# Patient Record
Sex: FEMALE | Race: WHITE | Employment: FULL TIME | URBAN - METROPOLITAN AREA
[De-identification: names, ages, dates, MRNs, and addresses within clinical notes are randomized per-mention and may not be internally consistent; named-entity substitution may affect disease eponyms.]

---

## 2017-04-10 ENCOUNTER — OFFICE VISIT (OUTPATIENT)
Dept: NEPHROLOGY | Facility: CLINIC | Age: 52
End: 2017-04-10

## 2017-04-10 VITALS
BODY MASS INDEX: 45 KG/M2 | DIASTOLIC BLOOD PRESSURE: 82 MMHG | WEIGHT: 262 LBS | RESPIRATION RATE: 14 BRPM | HEART RATE: 84 BPM | SYSTOLIC BLOOD PRESSURE: 118 MMHG

## 2017-04-10 DIAGNOSIS — R80.9 MICROALBUMINURIA: Primary | ICD-10-CM

## 2017-04-10 PROCEDURE — 99242 OFF/OP CONSLTJ NEW/EST SF 20: CPT | Performed by: INTERNAL MEDICINE

## 2017-04-10 NOTE — PROGRESS NOTES
Consult Note      REASON FOR CONSULT: Microalbuminuria         HPI:   Elvira Rizo is a 47year old female with Patient presents with:  Proteinuria    MD Gi Chavez was seen in the nephrology clinic today in consultation for manage ENDOSCOPY X'S 3    COLONOSCOPY      UPPER GI ENDOSCOPY,DIAGNOSIS N/A 10/10/2015    Comment Procedure: ESOPHAGOGASTRODUODENOSCOPY, COLONOSCOPY, POSSIBLE BIOPSY, POSSIBLE POLYPECTOMY 13226, 04615;  Surgeon: Mingo Ashton MD;  Location: [address] [place] [address] (two) times daily.  Disp: 180 tablet Rfl: 1   Insulin Pen Needle (BD PEN NEEDLE MINI U/F) 31G X 5 MM Does not apply Misc For BID use with insulin Disp: 200 each Rfl: 2   EVANGELINA MICROLET LANCETS Does not apply Misc CHECK SUGAR TWICE DAILY; A1C 9.2 Disp: 200 e lb    General: Alert and oriented in no apparent distress. HEENT: No scleral icterus, MMM  Neck: Supple, no KRISSY or thyromegaly  Cardiac: Regular rate and rhythm, S1, S2 normal, no murmur or rub  Lungs: Clear without wheezes, rales, rhonchi. Abdomen:  So was unable to tolerate angiotension receptor blocker as well.   Fortunately, she continues to have sub-nephrotic range proteinuria and we had a lengthy discussion in the office today regarding the importance of ongoing blood glucose control in an effort to

## 2017-06-08 RX ORDER — SPIRONOLACTONE 50 MG/1
50 TABLET, FILM COATED ORAL DAILY
Qty: 30 TABLET | Refills: 5 | Status: SHIPPED | OUTPATIENT
Start: 2017-06-08 | End: 2018-01-02

## 2017-07-17 PROCEDURE — 36415 COLL VENOUS BLD VENIPUNCTURE: CPT | Performed by: INTERNAL MEDICINE

## 2017-07-17 PROCEDURE — 82570 ASSAY OF URINE CREATININE: CPT | Performed by: INTERNAL MEDICINE

## 2017-07-17 PROCEDURE — 82043 UR ALBUMIN QUANTITATIVE: CPT | Performed by: INTERNAL MEDICINE

## 2017-07-31 ENCOUNTER — OFFICE VISIT (OUTPATIENT)
Dept: FAMILY MEDICINE CLINIC | Facility: CLINIC | Age: 52
End: 2017-07-31

## 2017-07-31 VITALS
BODY MASS INDEX: 37.05 KG/M2 | TEMPERATURE: 98 F | WEIGHT: 217 LBS | HEIGHT: 64 IN | HEART RATE: 104 BPM | SYSTOLIC BLOOD PRESSURE: 122 MMHG | DIASTOLIC BLOOD PRESSURE: 70 MMHG | RESPIRATION RATE: 18 BRPM

## 2017-07-31 DIAGNOSIS — J02.0 STREP THROAT: Primary | ICD-10-CM

## 2017-07-31 DIAGNOSIS — J02.9 SORE THROAT: ICD-10-CM

## 2017-07-31 LAB
CONTROL LINE PRESENT WITH A CLEAR BACKGROUND (YES/NO): YES YES/NO
STREP GRP A CUL-SCR: POSITIVE

## 2017-07-31 PROCEDURE — 99213 OFFICE O/P EST LOW 20 MIN: CPT | Performed by: PHYSICIAN ASSISTANT

## 2017-07-31 PROCEDURE — 87880 STREP A ASSAY W/OPTIC: CPT | Performed by: PHYSICIAN ASSISTANT

## 2017-07-31 RX ORDER — FLUCONAZOLE 150 MG/1
150 TABLET ORAL ONCE
Qty: 1 TABLET | Refills: 0 | Status: SHIPPED | OUTPATIENT
Start: 2017-07-31 | End: 2017-07-31

## 2017-07-31 RX ORDER — AMOXICILLIN 875 MG/1
875 TABLET, COATED ORAL 2 TIMES DAILY
Qty: 20 TABLET | Refills: 0 | Status: SHIPPED | OUTPATIENT
Start: 2017-07-31 | End: 2017-07-31 | Stop reason: CLARIF

## 2017-07-31 NOTE — PATIENT INSTRUCTIONS
-Cough drops, chloraseptic spray, warm tea with honey.  -Cool mist humidifier at night.    -tylenol for pain.  -If unable to swallow, have fever of 104, unable to tolerate fluids, or have any other worsening symptoms, please go to ER immediately.    Jacques Mallory Follow up with your healthcare provider or our staff if you are not improving over the next week.   When to seek medical advice  Call your healthcare provider right away if any of these occur:  · Fever as directed by your doctor   · New or worsening ear avis

## 2017-07-31 NOTE — PROGRESS NOTES
CHIEF COMPLAINT:   Patient presents with:  Sore Throat: fever, bodyaches x 1 day       HPI:   Phan Herzog is a 47year old female who presents for sore throat for  2 days. Has been exposed to strep.   Patient reports fever with tmax 101.2 yesterday, sw Loratadine-Pseudoephedrine ER (CLARITIN-D 12 HOUR) 5-120 MG Oral Tablet 12 Hr One tab bid prn Disp: 30 tablet Rfl: 1   Desonide 0.05 % External Ointment Apply to AA's of eyelids BID x 1 week. PRN flares.  Disp: 30 g Rfl: 1   Mometasone Furoate 0.1 % Externa COLONOSCOPY, POSSIBLE BIOPSY, POSSIBLE                POLYPECTOMY 15129, 57617;  Surgeon: Alessia Kwon MD;  Location: [address] [place] [address]      Smoking status: Never Smoker STREP GRP A CUL-SCR Neg Negative   Control Line Present with a clear background (yes/no) Yes Yes/No   Kit Lot # [id number] Numeric   Kit Expiration Date 11/30/2018 Date       ASSESSMENT AND PLAN:   Chichi Moreno is a 47year old female who presents with · No work or school for the first 2 days of taking the antibiotics. After this time, you will not be contagious.  You can then return to school or work if you are feeling better.   · The antibiotic medication must be taken for the full 10 days, even if you © 9124-9564 [address], [address] [place]. All rights reserved. This information is not intended as a substitute for professional medical care. Always follow your healthcare professional's instructions.       Follow up

## 2017-08-02 PROCEDURE — 82607 VITAMIN B-12: CPT | Performed by: INTERNAL MEDICINE

## 2017-08-02 PROCEDURE — 36415 COLL VENOUS BLD VENIPUNCTURE: CPT | Performed by: INTERNAL MEDICINE

## 2017-08-02 PROCEDURE — 86735 MUMPS ANTIBODY: CPT | Performed by: INTERNAL MEDICINE

## 2017-10-30 ENCOUNTER — OFFICE VISIT (OUTPATIENT)
Dept: FAMILY MEDICINE CLINIC | Facility: CLINIC | Age: 52
End: 2017-10-30

## 2017-10-30 VITALS
SYSTOLIC BLOOD PRESSURE: 134 MMHG | BODY MASS INDEX: 37.91 KG/M2 | TEMPERATURE: 99 F | WEIGHT: 224.81 LBS | RESPIRATION RATE: 18 BRPM | OXYGEN SATURATION: 97 % | DIASTOLIC BLOOD PRESSURE: 82 MMHG | HEIGHT: 64.5 IN | HEART RATE: 104 BPM

## 2017-10-30 DIAGNOSIS — J01.00 ACUTE MAXILLARY SINUSITIS, RECURRENCE NOT SPECIFIED: Primary | ICD-10-CM

## 2017-10-30 PROCEDURE — 99213 OFFICE O/P EST LOW 20 MIN: CPT | Performed by: NURSE PRACTITIONER

## 2017-10-30 NOTE — PROGRESS NOTES
CHIEF COMPLAINT:   Patient presents with:  Cough: cough, congestion, runny nose, sinus and facial pressure, teeth pain      HPI:   Nakul Ruff is a 47year old female who presents for cold symptoms for  1  weeks.  Symptoms have progressed into sinus con Insulin Pen Needle (BD PEN NEEDLE MINI U/F) 31G X 5 MM Does not apply Misc Four time daily Disp: 400 each Rfl: 1   Omega-3 Fatty Acids (FISH OIL) 1200 MG Oral Cap Take by mouth daily.  Disp:  Rfl:    insulin aspart (NOVOLOG FLEXPEN) 100 UNIT/ML Subcutaneous COLONOSCOPY, POSSIBLE BIOPSY, POSSIBLE                POLYPECTOMY 91141, 94094;  Surgeon: Coy Fernandez MD;  Location: [address] [place] [address]  9/08 [facility]: OTHER SURGICAL HISTORY      Comment: ENDOSCOPY X'S 3 LUNGS: clear to auscultation bilaterally, no wheezes or rhonchi. Breathing is non labored. CARDIO: RRR without murmur  EXTREMITIES: no cyanosis, clubbing or edema  LYMPH:  No cervical lymphadenopathy.         ASSESSMENT AND PLAN:   Tina Lincoln is a 47 Applying heat to the area surrounding your sinuses may make you feel more comfortable. Use a hot water bottle or a hand towel dipped in hot water. Some people also find ice packs effective for relieving pain.   Medicines  Your doctor may prescribe medicatio

## 2017-11-18 PROCEDURE — 82607 VITAMIN B-12: CPT | Performed by: INTERNAL MEDICINE

## 2018-01-07 RX ORDER — SPIRONOLACTONE 50 MG/1
TABLET, FILM COATED ORAL
Qty: 30 TABLET | Refills: 3 | Status: SHIPPED | OUTPATIENT
Start: 2018-01-07 | End: 2018-08-20

## 2018-02-06 ENCOUNTER — APPOINTMENT (OUTPATIENT)
Dept: LAB | Age: 53
End: 2018-02-06
Attending: INTERNAL MEDICINE
Payer: COMMERCIAL

## 2018-02-06 DIAGNOSIS — R80.9 MICROALBUMINURIA: ICD-10-CM

## 2018-02-06 PROCEDURE — 82570 ASSAY OF URINE CREATININE: CPT

## 2018-02-06 PROCEDURE — 82043 UR ALBUMIN QUANTITATIVE: CPT

## 2018-02-07 ENCOUNTER — OFFICE VISIT (OUTPATIENT)
Dept: NEPHROLOGY | Facility: CLINIC | Age: 53
End: 2018-02-07

## 2018-02-07 VITALS — SYSTOLIC BLOOD PRESSURE: 126 MMHG | WEIGHT: 223 LBS | DIASTOLIC BLOOD PRESSURE: 78 MMHG | BODY MASS INDEX: 38 KG/M2

## 2018-02-07 DIAGNOSIS — R80.9 MICROALBUMINURIA: Primary | ICD-10-CM

## 2018-02-07 LAB
CREAT UR-SCNC: 108 MG/DL
MICROALBUMIN UR-MCNC: 189 MG/DL
MICROALBUMIN/CREAT 24H UR-RTO: 1750 UG/MG (ref ?–30)

## 2018-02-07 PROCEDURE — 99214 OFFICE O/P EST MOD 30 MIN: CPT | Performed by: INTERNAL MEDICINE

## 2018-02-07 NOTE — PROGRESS NOTES
Nephrology Progress Note      Nayely Forbes is a 46year old female.     HPI:   Patient presents with:  Proteinuria      Les Vikas was seen in the nephrology clinic today in follow-up for management of microalbuminuria in the setting of long-standing Onset   • Cancer Father      Lung cancer   • Lung Disorder Father      COPD   • Hypertension Mother    • High Cholesterol Mother       Social History: Smoking status: Never Smoker                                                              Smokeless tobac Rfl: 3   Loratadine-Pseudoephedrine ER (CLARITIN-D 12 HOUR) 5-120 MG Oral Tablet 12 Hr One tab bid prn Disp: 30 tablet Rfl: 1   Desonide 0.05 % External Ointment Apply to AA's of eyelids BID x 1 week. PRN flares.  Disp: 30 g Rfl: 1   Mometasone Furoate 0.1 no palpable organomegaly  Extremities: Without clubbing, cyanosis or edema.   Neurologic: Alert and oriented, normal affect, cranial nerves grossly intact, moving all extremities  Skin: Warm and dry, no rashes      LABS:       Lab Results  Component Value D follow every 9 months or so for now.       Verl Harada, MD  2/7/2018  3:14 PM

## 2018-03-12 ENCOUNTER — OFFICE VISIT (OUTPATIENT)
Dept: FAMILY MEDICINE CLINIC | Facility: CLINIC | Age: 53
End: 2018-03-12

## 2018-03-12 VITALS — SYSTOLIC BLOOD PRESSURE: 128 MMHG | DIASTOLIC BLOOD PRESSURE: 76 MMHG

## 2018-03-12 DIAGNOSIS — R68.89 INFLUENZA-LIKE SYMPTOMS: Primary | ICD-10-CM

## 2018-03-12 PROCEDURE — 99213 OFFICE O/P EST LOW 20 MIN: CPT | Performed by: NURSE PRACTITIONER

## 2018-03-12 NOTE — PROGRESS NOTES
CHIEF COMPLAINT:   Patient presents with:  Cough/URI: x 1 week      HPI:   Nakul Ruff is a 46year old female who presents for upper respiratory symptoms for  1 weeks.  Patient reports sore throat only at the beginning of sx's, congestion, fever with T Loratadine-Pseudoephedrine ER (CLARITIN-D 12 HOUR) 5-120 MG Oral Tablet 12 Hr One tab bid prn Disp: 30 tablet Rfl: 1   Desonide 0.05 % External Ointment Apply to AA's of eyelids BID x 1 week. PRN flares.  Disp: 30 g Rfl: 1   Mometasone Furoate 0.1 % Externa Smoking status: Never Smoker                                                              Smokeless tobacco: Never Used                      Alcohol use:  No                  REVIEW OF SYSTEMS:   GENERAL: improving appetite  SKIN: no rashes or abnormal skin The patient is asked to f/u with PCP if sx's persist or worsen. Patient Instructions     Influenza (Adult)    Influenza is also called the flu. It is a viral illness that affects the air passages of your lungs. It is different from the common cold.  The fl Follow up with your healthcare provider, or as advised, if you are not getting better over the next week. If you are age 72 or older, talk with your provider about getting a pneumococcal vaccine every 5 years.  You should also get this vaccine if you have

## 2018-05-07 RX ORDER — SPIRONOLACTONE 50 MG/1
50 TABLET, FILM COATED ORAL
Qty: 30 TABLET | Refills: 3
Start: 2018-05-07

## 2018-05-08 ENCOUNTER — TELEPHONE (OUTPATIENT)
Dept: NEPHROLOGY | Facility: CLINIC | Age: 53
End: 2018-05-08

## 2018-05-08 RX ORDER — SPIRONOLACTONE 50 MG/1
50 TABLET, FILM COATED ORAL DAILY
Qty: 90 TABLET | Refills: 3 | Status: SHIPPED | OUTPATIENT
Start: 2018-05-08 | End: 2019-07-26

## 2018-05-14 RX ORDER — SPIRONOLACTONE 50 MG/1
TABLET, FILM COATED ORAL
Qty: 30 TABLET | Refills: 2 | OUTPATIENT
Start: 2018-05-14

## 2018-07-16 ENCOUNTER — OFFICE VISIT (OUTPATIENT)
Dept: FAMILY MEDICINE CLINIC | Facility: CLINIC | Age: 53
End: 2018-07-16

## 2018-07-16 VITALS
DIASTOLIC BLOOD PRESSURE: 74 MMHG | BODY MASS INDEX: 36.26 KG/M2 | TEMPERATURE: 99 F | RESPIRATION RATE: 18 BRPM | HEART RATE: 86 BPM | OXYGEN SATURATION: 97 % | WEIGHT: 215 LBS | HEIGHT: 64.5 IN | SYSTOLIC BLOOD PRESSURE: 136 MMHG

## 2018-07-16 DIAGNOSIS — M25.572 ACUTE BILATERAL ANKLE PAIN: Primary | ICD-10-CM

## 2018-07-16 DIAGNOSIS — M25.571 ACUTE BILATERAL ANKLE PAIN: Primary | ICD-10-CM

## 2018-07-16 DIAGNOSIS — M72.2 PLANTAR FASCIITIS, BILATERAL: ICD-10-CM

## 2018-07-16 PROCEDURE — 99213 OFFICE O/P EST LOW 20 MIN: CPT | Performed by: NURSE PRACTITIONER

## 2018-07-16 NOTE — PATIENT INSTRUCTIONS
- Advise rest, elevation, tylenol as needed. - If any increasing pain or swelling, you should follow up with primary doctor within 2-3 days.  - Go to the ER if ever pain or swelling is elevating anywhere into the calf area.       Understanding Heel Pain © 5012-5073 [employer] [address]. [address] [facility] [place]. All rights reserved. This information is not intended as a substitute for professional medical care. Always follow your healthcare professional's instructions.

## 2018-07-16 NOTE — PROGRESS NOTES
CHIEF COMPLAINT:     Patient presents with:   Foot Pain: Pain in soles of feet and ankles, x2-3 days      HPI:   Jennifer Gagnon is a 46year old female who presents with complaints of bilateral foot pain (mostly sole of foot, beginning at heel) and bilater clotrimazole-betamethasone 1-0.05 % External Cream Apply daily Disp: 15 g Rfl: 0   Fenofibrate 145 MG Oral Tab TAKE 1 BY MOUTH DAILY Disp: 90 tablet Rfl: 1   SPIRONOLACTONE 50 MG Oral Tab TAKE ONE TABLET BY MOUTH ONE TIME DAILY  Disp: 30 tablet Rfl: 3   Al • Type II or unspecified type diabetes mellitus without mention of complication, not stated as uncontrolled    • Unspecified essential hypertension       Social History:  Smoking status: Never Smoker - Allergy to NSAIDs and is diabetic-- will try to avoid PO steroid if possible. - Pt requesting short term note for work.   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 The pain may be sharp when you get out of bed or when you stand up after sitting for a while.  You may feel a dull ache in your heel after you’ve been standing for a long time on a hard anita

## 2018-07-26 PROBLEM — E11.65 UNCONTROLLED TYPE 2 DIABETES MELLITUS WITH BOTH EYES AFFECTED BY MILD NONPROLIFERATIVE RETINOPATHY WITHOUT MACULAR EDEMA, WITH LONG-TERM CURRENT USE OF INSULIN (HCC): Status: ACTIVE | Noted: 2018-07-26

## 2018-07-26 PROBLEM — E11.3293 UNCONTROLLED TYPE 2 DIABETES MELLITUS WITH BOTH EYES AFFECTED BY MILD NONPROLIFERATIVE RETINOPATHY WITHOUT MACULAR EDEMA, WITH LONG-TERM CURRENT USE OF INSULIN (HCC): Status: ACTIVE | Noted: 2018-07-26

## 2018-07-26 PROBLEM — IMO0002 UNCONTROLLED TYPE 2 DIABETES MELLITUS WITH BOTH EYES AFFECTED BY MILD NONPROLIFERATIVE RETINOPATHY WITHOUT MACULAR EDEMA, WITH LONG-TERM CURRENT USE OF INSULIN: Status: ACTIVE | Noted: 2018-07-26

## 2018-07-26 PROBLEM — Z79.4 UNCONTROLLED TYPE 2 DIABETES MELLITUS WITH BOTH EYES AFFECTED BY MILD NONPROLIFERATIVE RETINOPATHY WITHOUT MACULAR EDEMA, WITH LONG-TERM CURRENT USE OF INSULIN (HCC): Status: ACTIVE | Noted: 2018-07-26

## 2018-08-13 RX ORDER — SPIRONOLACTONE 100 MG/1
TABLET, FILM COATED ORAL
Qty: 45 TABLET | Refills: 0 | OUTPATIENT
Start: 2018-08-13

## 2018-08-22 ENCOUNTER — OFFICE VISIT (OUTPATIENT)
Dept: FAMILY MEDICINE CLINIC | Facility: CLINIC | Age: 53
End: 2018-08-22
Payer: COMMERCIAL

## 2018-08-22 ENCOUNTER — HOSPITAL ENCOUNTER (OUTPATIENT)
Age: 53
Discharge: HOME | End: 2018-08-22
Payer: COMMERCIAL

## 2018-08-22 VITALS
RESPIRATION RATE: 20 BRPM | DIASTOLIC BLOOD PRESSURE: 70 MMHG | TEMPERATURE: 98 F | HEART RATE: 97 BPM | OXYGEN SATURATION: 100 % | SYSTOLIC BLOOD PRESSURE: 132 MMHG

## 2018-08-22 VITALS
WEIGHT: 228 LBS | RESPIRATION RATE: 20 BRPM | OXYGEN SATURATION: 97 % | TEMPERATURE: 99 F | DIASTOLIC BLOOD PRESSURE: 76 MMHG | HEART RATE: 84 BPM | BODY MASS INDEX: 38.93 KG/M2 | SYSTOLIC BLOOD PRESSURE: 142 MMHG | HEIGHT: 64 IN

## 2018-08-22 DIAGNOSIS — Z02.9 ENCOUNTERS FOR ADMINISTRATIVE PURPOSE: ICD-10-CM

## 2018-08-22 DIAGNOSIS — R80.9 PROTEINURIA, UNSPECIFIED TYPE: Primary | ICD-10-CM

## 2018-08-22 DIAGNOSIS — N76.0 ACUTE VAGINITIS: ICD-10-CM

## 2018-08-22 DIAGNOSIS — N32.9: Primary | ICD-10-CM

## 2018-08-22 LAB
MULTISTIX LOT#: ABNORMAL NUMERIC
PH, URINE: 5.5 (ref 4.5–8)
POCT BILIRUBIN URINE: NEGATIVE
POCT BLOOD URINE: NEGATIVE
POCT GLUCOSE URINE: NEGATIVE MG/DL
POCT KETONE URINE: NEGATIVE MG/DL
POCT LEUKOCYTE ESTERASE URINE: NEGATIVE
POCT NITRITE URINE: NEGATIVE
POCT PH URINE: 6 (ref 5–8)
POCT SPECIFIC GRAVITY URINE: 1.03
POCT URINE CLARITY: CLEAR
POCT URINE COLOR: YELLOW
POCT UROBILINOGEN URINE: 0.2 MG/DL
PROTEIN (URINE DIPSTICK): 300 MG/DL
SPECIFIC GRAVITY: 1.03 (ref 1–1.03)
URINE-COLOR: YELLOW
UROBILINOGEN,SEMI-QN: 0.2 MG/DL (ref 0–1.9)

## 2018-08-22 PROCEDURE — 81003 URINALYSIS AUTO W/O SCOPE: CPT | Performed by: PHYSICIAN ASSISTANT

## 2018-08-22 PROCEDURE — 87086 URINE CULTURE/COLONY COUNT: CPT | Performed by: PHYSICIAN ASSISTANT

## 2018-08-22 PROCEDURE — 87480 CANDIDA DNA DIR PROBE: CPT | Performed by: PHYSICIAN ASSISTANT

## 2018-08-22 PROCEDURE — 87660 TRICHOMONAS VAGIN DIR PROBE: CPT | Performed by: PHYSICIAN ASSISTANT

## 2018-08-22 PROCEDURE — 81002 URINALYSIS NONAUTO W/O SCOPE: CPT | Performed by: EMERGENCY MEDICINE

## 2018-08-22 PROCEDURE — 87510 GARDNER VAG DNA DIR PROBE: CPT | Performed by: PHYSICIAN ASSISTANT

## 2018-08-22 PROCEDURE — 99204 OFFICE O/P NEW MOD 45 MIN: CPT

## 2018-08-22 PROCEDURE — 99214 OFFICE O/P EST MOD 30 MIN: CPT

## 2018-08-22 RX ORDER — FLUCONAZOLE 150 MG/1
150 TABLET ORAL ONCE
Qty: 1 TABLET | Refills: 0 | Status: SHIPPED | OUTPATIENT
Start: 2018-08-22 | End: 2018-08-22

## 2018-08-22 NOTE — PROGRESS NOTES
Patient presents with left lower abdominal \"zinging\" pain and suprapubic pressure x2-3 days, states works at a  and has been doing a lot of lifting and pain worsens when she bends over then stands up. Concerned about UTI.   Also states is having s

## 2018-08-22 NOTE — ED INITIAL ASSESSMENT (HPI)
Left lower  Abdomen- pressure on and off since Monday night. Denies burning with urination. C/o sensation bladder is full. Denies fever, blood in urine. Vaginal discharge-  X 2 weeks whitish in color. Pt has h/o  Recurrent vaginitis.   sometimes with

## 2018-08-22 NOTE — ED PROVIDER NOTES
Patient Seen in: [facility]    History   Patient presents with:  Urinary Symptoms (urologic)  Vaginal Discharge    Stated Complaint: Possible UTI    HPI    50-year-old female here with complaint of suprapubic pressure predominan 3  10/10/2015: UPPER GI ENDOSCOPY,DIAGNOSIS N/A      Comment: Procedure: ESOPHAGOGASTRODUODENOSCOPY,                COLONOSCOPY, POSSIBLE BIOPSY, POSSIBLE                POLYPECTOMY 85310, 57500;  Surgeon: Jm Riggs MD;  Location: [facility] distension. There is no tenderness. Genitourinary: Vaginal discharge found. Genitourinary Comments: Whitish discharge noted no CMT   Neurological: She is alert and oriented to person, place, and time. She has normal reflexes.    Skin: Skin is warm and d

## 2018-08-23 NOTE — ED NOTES
Phone call placed to pt's phone, message left on voice mail asking pt to please return our call. Return phone number provided.   (Pt to begin Flagyl for  Positive BV)

## 2018-08-23 NOTE — ED NOTES
Pt returned our call and was notified of her vag panel result, and need to take flagyl 500 mg bid for 7 days. Pt requests same [place] pharmacy for the prescribed Flagyl. Prescription called in as ordered.

## 2018-11-05 DIAGNOSIS — R80.9 PROTEINURIA, UNSPECIFIED TYPE: Primary | ICD-10-CM

## 2018-11-07 ENCOUNTER — OFFICE VISIT (OUTPATIENT)
Dept: NEPHROLOGY | Facility: CLINIC | Age: 53
End: 2018-11-07
Payer: COMMERCIAL

## 2018-11-07 ENCOUNTER — APPOINTMENT (OUTPATIENT)
Dept: LAB | Age: 53
End: 2018-11-07
Attending: INTERNAL MEDICINE
Payer: COMMERCIAL

## 2018-11-07 VITALS — SYSTOLIC BLOOD PRESSURE: 148 MMHG | BODY MASS INDEX: 41 KG/M2 | DIASTOLIC BLOOD PRESSURE: 84 MMHG | WEIGHT: 237 LBS

## 2018-11-07 DIAGNOSIS — R60.0 LOWER EXTREMITY EDEMA: ICD-10-CM

## 2018-11-07 DIAGNOSIS — R80.9 MICROALBUMINURIA: Primary | ICD-10-CM

## 2018-11-07 PROCEDURE — 82570 ASSAY OF URINE CREATININE: CPT | Performed by: INTERNAL MEDICINE

## 2018-11-07 PROCEDURE — 99214 OFFICE O/P EST MOD 30 MIN: CPT | Performed by: INTERNAL MEDICINE

## 2018-11-07 PROCEDURE — 82043 UR ALBUMIN QUANTITATIVE: CPT | Performed by: INTERNAL MEDICINE

## 2018-11-07 RX ORDER — FUROSEMIDE 20 MG/1
20 TABLET ORAL DAILY
Qty: 30 TABLET | Refills: 11 | Status: SHIPPED | OUTPATIENT
Start: 2018-11-07 | End: 2020-02-03

## 2018-11-07 NOTE — PROGRESS NOTES
Nephrology Progress Note      Tina Lincoln is a 46year old female. HPI:   Patient presents with:   Other: microalbuminuria      Payal Campos was seen in the nephrology clinic today in follow-up for management of microalbuminuria in the setting of lo [address]      Family History   Problem Relation Age of Onset   • Cancer Father         Lung cancer   • Lung Disorder Father         COPD   • Hypertension Mother    • High Cholesterol Mother       Social History: Social History    Tobacco Use Rfl: 1   Desonide 0.05 % External Ointment Apply to AA's of eyelids BID x 1 week. PRN flares. Disp: 30 g Rfl: 1   Mometasone Furoate 0.1 % External Cream Apply to AA of toe BID x 1-2 weeks. PRN flares.  Disp: 30 g Rfl: 1   EVANGELINA MICROLET LANCETS Does not ap 11/18/2017     07/16/2016    CA 9.1 07/16/2016    ALKPHO 62 11/18/2017    AST 24 11/18/2017    ALT 29 11/18/2017    BILT 0.23 11/18/2017    TP 7.3 11/18/2017    ALB 3.2 (L) 11/18/2017     11/18/2017    K 4.5 11/18/2017     11/18/2017 again for allowing me to participate in the care of your patient. Please do not hesitate to call with any questions or concerns.     Ananya Stein MD  11/7/2018  3:32 PM

## 2019-01-20 ENCOUNTER — OFFICE VISIT (OUTPATIENT)
Dept: FAMILY MEDICINE CLINIC | Facility: CLINIC | Age: 54
End: 2019-01-20
Payer: COMMERCIAL

## 2019-01-20 VITALS
SYSTOLIC BLOOD PRESSURE: 144 MMHG | TEMPERATURE: 98 F | OXYGEN SATURATION: 98 % | DIASTOLIC BLOOD PRESSURE: 76 MMHG | HEART RATE: 97 BPM | RESPIRATION RATE: 20 BRPM

## 2019-01-20 DIAGNOSIS — J01.00 ACUTE MAXILLARY SINUSITIS, RECURRENCE NOT SPECIFIED: Primary | ICD-10-CM

## 2019-01-20 PROCEDURE — 99213 OFFICE O/P EST LOW 20 MIN: CPT | Performed by: NURSE PRACTITIONER

## 2019-01-20 RX ORDER — AMOXICILLIN AND CLAVULANATE POTASSIUM 875; 125 MG/1; MG/1
1 TABLET, FILM COATED ORAL 2 TIMES DAILY
Qty: 20 TABLET | Refills: 0 | Status: SHIPPED | OUTPATIENT
Start: 2019-01-20 | End: 2019-01-30

## 2019-01-20 NOTE — PROGRESS NOTES
CHIEF COMPLAINT:   Patient presents with:  Sinus Problem: x5 days, works at       HPI:   Shani Salazar is a 46year old female who presents for sinus pressure/congestion for  5  days, reports has been having allergy symptoms \"bad\" for the past c clotrimazole-betamethasone 1-0.05 % External Cream Apply daily Disp: 15 g Rfl: 0   Albuterol Sulfate HFA (PROAIR HFA) 108 (90 Base) MCG/ACT Inhalation Aero Soln Inhale 2 puffs into the lungs every 6 (six) hours as needed for Wheezing or Shortness of Breath Problem Relation Age of Onset   • Cancer Father         Lung cancer   • Lung Disorder Father         COPD   • Hypertension Mother    • High Cholesterol Mother       Social History    Tobacco Use      Smoking status: Never Smoker      Smokeless tobacco: Nev PLAN: Meds as below. Mucinex to help thin secretions. Increase fluids and rest.  Comfort care as described in Patient Instructions. Follow up with PCP in 2-3 days if no improvement, sooner if worsening.  If any difficulty breathing, SOB, or wheezing seek em · You can use an over-the-counter decongestant, unless a similar medicine was prescribed to you. Nasal sprays work the fastest. Use one that contains phenylephrine or oxymetazoline. First blow your nose gently. Then use the spray.  Do not use these medicine · Don’t have close contact with people who have sore throats, colds, or other upper respiratory infections. · Don’t smoke, and stay away from secondhand smoke. · Stay up to date with of your vaccines.   Date Last Reviewed: 11/1/2017  © 9630-4864 [employer]

## 2019-04-10 ENCOUNTER — NURSE ONLY (OUTPATIENT)
Dept: FAMILY MEDICINE CLINIC | Facility: CLINIC | Age: 54
End: 2019-04-10
Payer: COMMERCIAL

## 2019-04-10 VITALS
HEIGHT: 64.5 IN | DIASTOLIC BLOOD PRESSURE: 64 MMHG | BODY MASS INDEX: 39.3 KG/M2 | OXYGEN SATURATION: 97 % | HEART RATE: 95 BPM | SYSTOLIC BLOOD PRESSURE: 140 MMHG | WEIGHT: 233 LBS | TEMPERATURE: 99 F

## 2019-04-10 DIAGNOSIS — R50.9 FEVER, UNSPECIFIED FEVER CAUSE: ICD-10-CM

## 2019-04-10 DIAGNOSIS — Z87.09 HISTORY OF ASTHMA: ICD-10-CM

## 2019-04-10 DIAGNOSIS — J40 BRONCHITIS: Primary | ICD-10-CM

## 2019-04-10 DIAGNOSIS — J01.90 ACUTE NON-RECURRENT SINUSITIS, UNSPECIFIED LOCATION: ICD-10-CM

## 2019-04-10 DIAGNOSIS — R05.9 COUGH: ICD-10-CM

## 2019-04-10 DIAGNOSIS — R06.2 WHEEZING: ICD-10-CM

## 2019-04-10 PROCEDURE — 94640 AIRWAY INHALATION TREATMENT: CPT | Performed by: NURSE PRACTITIONER

## 2019-04-10 PROCEDURE — 99214 OFFICE O/P EST MOD 30 MIN: CPT | Performed by: NURSE PRACTITIONER

## 2019-04-10 RX ORDER — PREDNISONE 20 MG/1
40 TABLET ORAL DAILY
Qty: 10 TABLET | Refills: 0 | Status: SHIPPED | OUTPATIENT
Start: 2019-04-10 | End: 2019-04-15

## 2019-04-10 RX ORDER — IPRATROPIUM BROMIDE AND ALBUTEROL SULFATE 2.5; .5 MG/3ML; MG/3ML
3 SOLUTION RESPIRATORY (INHALATION) ONCE
Status: COMPLETED | OUTPATIENT
Start: 2019-04-10 | End: 2019-04-10

## 2019-04-10 RX ORDER — BENZONATATE 200 MG/1
200 CAPSULE ORAL 3 TIMES DAILY PRN
Qty: 20 CAPSULE | Refills: 0 | Status: SHIPPED | OUTPATIENT
Start: 2019-04-10 | End: 2019-04-17

## 2019-04-10 RX ORDER — ALBUTEROL SULFATE 90 UG/1
2 AEROSOL, METERED RESPIRATORY (INHALATION) EVERY 4 HOURS PRN
Qty: 1 INHALER | Refills: 0 | Status: SHIPPED | OUTPATIENT
Start: 2019-04-10 | End: 2019-05-08

## 2019-04-10 RX ORDER — AZITHROMYCIN 250 MG/1
TABLET, FILM COATED ORAL
Qty: 6 TABLET | Refills: 0 | Status: SHIPPED | OUTPATIENT
Start: 2019-04-10 | End: 2019-04-15

## 2019-04-10 RX ADMIN — IPRATROPIUM BROMIDE AND ALBUTEROL SULFATE 3 ML: 2.5; .5 SOLUTION RESPIRATORY (INHALATION) at 14:27:00

## 2019-04-10 NOTE — PROGRESS NOTES
Patient presents with:  Cough: cough is dry and with phlegm, runny nose, nose congestion x 5 dys drainage x 2 mons fever x 1 dy hx of asthma     HPI:   Joby Alba is a 53year old female who presents for sinus congestion and cough for  4  weeks+.  Thin Omeprazole 40 MG Oral Capsule Delayed Release TAKE ONE CAPSULE BY MOUTH ONCE DAILY Disp: 90 capsule Rfl: 1   metFORMIN HCl 1000 MG Oral Tab Take 1 tablet (1,000 mg total) by mouth 2 (two) times daily with meals.  Disp: 180 tablet Rfl: 1   Insulin Aspart Pen Desonide 0.05 % External Ointment Apply to AA's of eyelids BID x 1 week. PRN flares. Disp: 30 g Rfl: 1   Mometasone Furoate 0.1 % External Cream Apply to AA of toe BID x 1-2 weeks. PRN flares.  Disp: 30 g Rfl: 1      Past Medical History:   Diagnosis Date LUNGS: Normal respiratory rate. Normal effort. Dry cough. + wheezing and rhonchi bilateral.   CARDIO: RRR without murmur  EXTREMITIES: no cyanosis, clubbing or edema  LYMPH:  Bilateral anterior cervical lymphadenopathy.     Due to rhonchi and wheezing pt g - predniSONE 20 MG Oral Tab; Take 2 tablets (40 mg total) by mouth daily for 5 days. Dispense: 10 tablet; Refill: 0  - Albuterol Sulfate HFA (PROAIR HFA) 108 (90 Base) MCG/ACT Inhalation Aero Soln;  Inhale 2 puffs into the lungs every 4 (four) hours as nee Follow these guidelines when caring for yourself at home:  · If your symptoms are severe, rest at home for the first 2 to 3 days. When you go back to your usual activities, don't let yourself get too tired. · Do not smoke.  Also avoid being exposed to seco · Fever of 100.4°F (38°C) or higher, or as directed by your healthcare provider  · Coughing up increased amounts of colored sputum  · Weakness, drowsiness, headache, facial pain, ear pain, or a stiff neck  Call 911  Call 911 if any of these occur.   · Cough · You can use an over-the-counter decongestant, unless a similar medicine was prescribed to you. Nasal sprays work the fastest. Use one that contains phenylephrine or oxymetazoline. First blow your nose gently. Then use the spray.  Do not use these medicine · Don’t have close contact with people who have sore throats, colds, or other upper respiratory infections. · Don’t smoke, and stay away from secondhand smoke. · Stay up to date with of your vaccines.   Date Last Reviewed: 11/1/2017  © 9102-0155 [employer]

## 2019-04-10 NOTE — PATIENT INSTRUCTIONS
Bronchitis, Antibiotic Treatment (Adult)    Bronchitis is an infection of the air passages (bronchial tubes) in your lungs. It often occurs when you have a cold.  This illness is contagious during the first few days and is spread through the air by coughi Follow-up care  Follow up with your healthcare provider, or as advised. If you had an X-ray or ECG (electrocardiogram), a specialist will review it. You will be notified of any new findings that may affect your care.   If you are age 72 or older, or if you · Take the full course of antibiotics as instructed. Do not stop taking them, even when you feel better. · Drink plenty of water, hot tea, and other liquids. This may help thin nasal mucus. It also may help your sinuses drain fluids.   · Heat may help soot Follow up with your healthcare provider or our staff if you are better in 1 week.   When to seek medical advice  Call your healthcare provider if any of these occur:  · Facial pain or headache that gets worse  · Stiff neck  · Unusual drowsiness or confusion

## 2019-05-06 ENCOUNTER — APPOINTMENT (OUTPATIENT)
Dept: LAB | Age: 54
End: 2019-05-06
Attending: INTERNAL MEDICINE
Payer: COMMERCIAL

## 2019-05-06 DIAGNOSIS — R80.9 MICROALBUMINURIA: ICD-10-CM

## 2019-05-06 DIAGNOSIS — R60.0 LOWER EXTREMITY EDEMA: ICD-10-CM

## 2019-05-06 PROCEDURE — 81001 URINALYSIS AUTO W/SCOPE: CPT | Performed by: INTERNAL MEDICINE

## 2019-05-06 PROCEDURE — 82570 ASSAY OF URINE CREATININE: CPT | Performed by: INTERNAL MEDICINE

## 2019-05-06 PROCEDURE — 82043 UR ALBUMIN QUANTITATIVE: CPT | Performed by: INTERNAL MEDICINE

## 2019-05-08 ENCOUNTER — OFFICE VISIT (OUTPATIENT)
Dept: NEPHROLOGY | Facility: CLINIC | Age: 54
End: 2019-05-08
Payer: COMMERCIAL

## 2019-05-08 VITALS — DIASTOLIC BLOOD PRESSURE: 80 MMHG | WEIGHT: 238 LBS | SYSTOLIC BLOOD PRESSURE: 146 MMHG | BODY MASS INDEX: 41 KG/M2

## 2019-05-08 DIAGNOSIS — R60.0 LOWER EXTREMITY EDEMA: ICD-10-CM

## 2019-05-08 DIAGNOSIS — R80.9 MICROALBUMINURIA: Primary | ICD-10-CM

## 2019-05-08 PROCEDURE — 99214 OFFICE O/P EST MOD 30 MIN: CPT | Performed by: INTERNAL MEDICINE

## 2019-05-08 NOTE — PROGRESS NOTES
Nephrology Progress Note      Parvin Born is a 53year old female. HPI:   Patient presents with:   Other: microalbuminuria       Leta Mejia was seen in the nephrology clinic today in follow-up for management of microalbuminuria in the setting of l use: No       Medications (Active prior to today's visit):    Current Outpatient Medications:  insulin glargine (TOUJEO MAX SOLOSTAR) 300 UNIT/ML Subcutaneous Solution Pen-injector Inject 100 Units into the skin nightly.  Disp: 9 pen Rfl: 1   clotrimazole-b Inhaler Rfl: 0   Insulin Pen Needle (NOVOFINE PLUS) 32G X 4 MM Does not apply Misc Use 4 per day Disp: 400 each Rfl: 3   Omega-3 Fatty Acids (FISH OIL) 1200 MG Oral Cap Take by mouth daily.  Disp:  Rfl:    Glucose Blood (EVANGELINA CONTOUR NEXT TEST) In [facility] normal, no murmur or rub  Lungs: Clear without wheezes, rales, rhonchi. Extremities: No significant pitting edema.   Neurologic: Alert and oriented, normal affect, cranial nerves grossly intact, moving all extremities  Skin: Warm and dry, no rashes Fortunately she continues to remain normal in terms of renal function with a creatinine at close to 0.8 mg/dL or so.   We again discussed the importance of good blood glucose and blood pressure control and attempting to lower her protein excretion and maint

## 2019-07-29 RX ORDER — SPIRONOLACTONE 50 MG/1
TABLET, FILM COATED ORAL
Qty: 90 TABLET | Refills: 1 | Status: SHIPPED | OUTPATIENT
Start: 2019-07-29 | End: 2020-04-17

## 2019-08-27 ENCOUNTER — OFFICE VISIT (OUTPATIENT)
Dept: FAMILY MEDICINE CLINIC | Facility: CLINIC | Age: 54
End: 2019-08-27
Payer: COMMERCIAL

## 2019-08-27 VITALS
HEART RATE: 67 BPM | HEIGHT: 64 IN | SYSTOLIC BLOOD PRESSURE: 126 MMHG | BODY MASS INDEX: 39.27 KG/M2 | WEIGHT: 230 LBS | RESPIRATION RATE: 16 BRPM | TEMPERATURE: 99 F | DIASTOLIC BLOOD PRESSURE: 82 MMHG | OXYGEN SATURATION: 97 %

## 2019-08-27 DIAGNOSIS — L73.9 FOLLICULITIS: Primary | ICD-10-CM

## 2019-08-27 PROCEDURE — 99213 OFFICE O/P EST LOW 20 MIN: CPT | Performed by: NURSE PRACTITIONER

## 2019-08-27 RX ORDER — MUPIROCIN CALCIUM 20 MG/G
1 CREAM TOPICAL 3 TIMES DAILY
Qty: 30 G | Refills: 0 | Status: SHIPPED | OUTPATIENT
Start: 2019-08-27 | End: 2019-09-10

## 2019-08-27 NOTE — PATIENT INSTRUCTIONS
Understanding Folliculitis  Folliculitis is when hair follicles become inflamed. Follicles are the tiny holes from which hair grows out of your skin. This skin condition can occur any place on the body where hair grows.  But it’s often found on the neck, © 3029-9207 [employer] [address]. [address] [facility] [place] [facility], [facility] [place]. All rights reserved. This information is not intended as a substitute for professional medical care. Always follow your healthcare professional's instructions.

## 2019-08-27 NOTE — PROGRESS NOTES
CHIEF COMPLAINT:   Patient presents with:  Rash: right side sx x 1 month. HPI:    Pratibha Hill is a 53year old female who presents for evaluation of a rash. Per patient rash started in the past 1 month. Rash has been worsening since onset.   Rd metFORMIN HCl 1000 MG Oral Tab Take 1 tablet (1,000 mg total) by mouth 2 (two) times daily with meals.  Disp: 180 tablet Rfl: 1   Insulin Aspart Pen (NOVOLOG FLEXPEN) 100 UNIT/ML Subcutaneous Solution Pen-injector Inject 20 units TID before meals and slidin • Other and unspecified hyperlipidemia    • Type II or unspecified type diabetes mellitus without mention of complication, not stated as uncontrolled    • Unspecified essential hypertension       Past Surgical History:   Procedure Laterality Date   • C-SEC EYES:  conjunctiva are clear  HENT: Head atraumatic, normocephalic. NECK:  Supple. Non tender. LUNGS: Clear to auscultation bilaterally. No wheezing, rhonchi, or rales. No diminished breath sounds. No increased work of breathing.    CARDIO: RRR without · Swollen  Treatment for folliculitis  Treatment depends on the cause of the inflammation. In some cases, this skin condition will go away on its own in a few days. But it may return. Treatment options include:  · Warm compress.  Putting a warm, wet washclo

## 2019-10-29 ENCOUNTER — OFFICE VISIT (OUTPATIENT)
Dept: FAMILY MEDICINE CLINIC | Facility: CLINIC | Age: 54
End: 2019-10-29
Payer: COMMERCIAL

## 2019-10-29 VITALS
SYSTOLIC BLOOD PRESSURE: 128 MMHG | BODY MASS INDEX: 40.58 KG/M2 | HEIGHT: 64.5 IN | WEIGHT: 240.63 LBS | DIASTOLIC BLOOD PRESSURE: 88 MMHG | RESPIRATION RATE: 20 BRPM | HEART RATE: 95 BPM | TEMPERATURE: 98 F

## 2019-10-29 DIAGNOSIS — J02.9 ACUTE VIRAL PHARYNGITIS: Primary | ICD-10-CM

## 2019-10-29 DIAGNOSIS — J02.9 SORE THROAT: ICD-10-CM

## 2019-10-29 PROCEDURE — 87880 STREP A ASSAY W/OPTIC: CPT | Performed by: NURSE PRACTITIONER

## 2019-10-29 PROCEDURE — 99213 OFFICE O/P EST LOW 20 MIN: CPT | Performed by: NURSE PRACTITIONER

## 2019-10-29 NOTE — PROGRESS NOTES
CHIEF COMPLAINT:   Patient presents with:  Sore Throat: sore throat, cough, pnd x 1 day  otc cough drops    HPI:   Dann Huertas is a 53year old female presents to clinic with complaint of sore throat. Patient has had 1 days.  Symptoms have been persis metFORMIN HCl 1000 MG Oral Tab, Take 1 tablet (1,000 mg total) by mouth 2 (two) times daily with meals. , Disp: 180 tablet, Rfl: 1  Insulin Aspart Pen (NOVOLOG FLEXPEN) 100 UNIT/ML Subcutaneous Solution Pen-injector, Inject 20 units TID before meals and sli • Type II or unspecified type diabetes mellitus without mention of complication, not stated as uncontrolled    • Unspecified essential hypertension       Social History:  Social History    Tobacco Use      Smoking status: Never Smoker      Smokeless tobacc Control Line Present with a clear background (yes/no) yes Yes/No    Kit Lot # [id number] Numeric    Kit Expiration Date 3/31/21 Date         ASSESSMENT AND PLAN:   Assessment:   Sore throat  Acute viral pharyngitis  (primary encounter diagnosis)     Sore · Don’t eat salty or spicy foods or give them to your child. These can be irritating to the throat. Medicines for a child: You can give your child acetaminophen for fever, fussiness, or discomfort.  In babies over 7 months of age, you may use ibuprofen ins © 9620-6079 [employer] [address]. [address] [facility] [place], [facility] [place]. All rights reserved. This information is not intended as a substitute for professional medical care. Always follow your healthcare professional's instructions.

## 2019-11-12 ENCOUNTER — APPOINTMENT (OUTPATIENT)
Dept: LAB | Age: 54
End: 2019-11-12
Attending: INTERNAL MEDICINE
Payer: COMMERCIAL

## 2019-11-12 DIAGNOSIS — R60.0 LOWER EXTREMITY EDEMA: ICD-10-CM

## 2019-11-12 DIAGNOSIS — R80.9 MICROALBUMINURIA: ICD-10-CM

## 2019-11-12 PROCEDURE — 36415 COLL VENOUS BLD VENIPUNCTURE: CPT | Performed by: INTERNAL MEDICINE

## 2019-11-12 PROCEDURE — 82043 UR ALBUMIN QUANTITATIVE: CPT | Performed by: INTERNAL MEDICINE

## 2019-11-12 PROCEDURE — 80048 BASIC METABOLIC PNL TOTAL CA: CPT | Performed by: INTERNAL MEDICINE

## 2019-11-12 PROCEDURE — 82570 ASSAY OF URINE CREATININE: CPT | Performed by: INTERNAL MEDICINE

## 2019-11-13 ENCOUNTER — OFFICE VISIT (OUTPATIENT)
Dept: NEPHROLOGY | Facility: CLINIC | Age: 54
End: 2019-11-13
Payer: COMMERCIAL

## 2019-11-13 VITALS — DIASTOLIC BLOOD PRESSURE: 78 MMHG | SYSTOLIC BLOOD PRESSURE: 136 MMHG | WEIGHT: 241 LBS | BODY MASS INDEX: 41 KG/M2

## 2019-11-13 DIAGNOSIS — R60.0 LOWER EXTREMITY EDEMA: ICD-10-CM

## 2019-11-13 DIAGNOSIS — R80.9 MICROALBUMINURIA: Primary | ICD-10-CM

## 2019-11-13 PROCEDURE — 99214 OFFICE O/P EST MOD 30 MIN: CPT | Performed by: INTERNAL MEDICINE

## 2019-11-13 NOTE — PROGRESS NOTES
Nephrology Progress Note      Kaitlynn Vital is a 53year old female. HPI:   Patient presents with:   Other: microalbuminuria      Sujey Segura was seen in the nephrology clinic today in follow-up for management of microalbuminuria in the setting of lo Medications   Medication Sig Dispense Refill   • Continuous Blood Gluc Sensor (FREESTYLE ROSENDA 14 DAY SENSOR) Does not apply Misc 7 Units by Does not apply route as needed.  2 each 0   • OMEPRAZOLE 40 MG Oral Capsule Delayed Release TAKE ONE CAPSULE BY MOUT Oral Cap Take by mouth daily.      • Glucose Blood (EVANGELINA CONTOUR NEXT TEST) In Vitro Strip Test 4 times daily 400 each 3   • Loratadine-Pseudoephedrine ER (CLARITIN-D 12 HOUR) 5-120 MG Oral Tablet 12 Hr One tab bid prn 30 tablet 1   • Desonide 0.05 % Exter oriented, normal affect, cranial nerves grossly intact, moving all extremities  Skin: Warm and dry, no rashes      LABS:     Lab Results   Component Value Date    BUN 32 (H) 11/12/2019    BUNCREA 34.8 (H) 11/12/2019    CREATSERUM 0.92 11/12/2019    ANIONGA at 0.92 mg/dL. We will continue to discuss the importance of improving her blood glucose control and lowering her urinary protein excretion and maintaining her kidney health over time.   She reports anaphylaxis to ACE inhibitors and also an allergy to katherine

## 2019-12-11 ENCOUNTER — APPOINTMENT (OUTPATIENT)
Dept: LAB | Age: 54
End: 2019-12-11
Attending: INTERNAL MEDICINE
Payer: COMMERCIAL

## 2019-12-11 DIAGNOSIS — E11.29 TYPE 2 DIABETES MELLITUS WITH MICROALBUMINURIA, WITH LONG-TERM CURRENT USE OF INSULIN (HCC): ICD-10-CM

## 2019-12-11 DIAGNOSIS — R80.9 TYPE 2 DIABETES MELLITUS WITH MICROALBUMINURIA, WITH LONG-TERM CURRENT USE OF INSULIN (HCC): ICD-10-CM

## 2019-12-11 DIAGNOSIS — Z79.4 TYPE 2 DIABETES MELLITUS WITH MICROALBUMINURIA, WITH LONG-TERM CURRENT USE OF INSULIN (HCC): ICD-10-CM

## 2019-12-11 PROCEDURE — 83036 HEMOGLOBIN GLYCOSYLATED A1C: CPT

## 2019-12-11 PROCEDURE — 36415 COLL VENOUS BLD VENIPUNCTURE: CPT

## 2019-12-13 PROBLEM — E66.01 OBESITY, MORBID (HCC): Status: ACTIVE | Noted: 2019-12-13

## 2020-02-03 RX ORDER — FUROSEMIDE 20 MG/1
20 TABLET ORAL DAILY
Qty: 90 TABLET | Refills: 1 | Status: SHIPPED | OUTPATIENT
Start: 2020-02-03 | End: 2020-07-13

## 2020-05-14 ENCOUNTER — TELEPHONE (OUTPATIENT)
Dept: NEPHROLOGY | Facility: CLINIC | Age: 55
End: 2020-05-14

## 2020-05-14 NOTE — TELEPHONE ENCOUNTER
Dr. Camilla Reaves answered in routing comment that April labs are sufficient for this visit. No more labs necessary.

## 2020-05-15 PROBLEM — Z15.89 APOE*4/*4 GENOTYPE: Status: ACTIVE | Noted: 2020-05-15

## 2020-05-27 ENCOUNTER — VIRTUAL PHONE E/M (OUTPATIENT)
Dept: NEPHROLOGY | Facility: CLINIC | Age: 55
End: 2020-05-27
Payer: COMMERCIAL

## 2020-05-27 DIAGNOSIS — R80.9 MICROALBUMINURIA: Primary | ICD-10-CM

## 2020-05-27 DIAGNOSIS — N18.2 CKD (CHRONIC KIDNEY DISEASE) STAGE 2, GFR 60-89 ML/MIN: ICD-10-CM

## 2020-05-27 PROCEDURE — 99212 OFFICE O/P EST SF 10 MIN: CPT | Performed by: INTERNAL MEDICINE

## 2020-05-27 NOTE — PROGRESS NOTES
I spoke with pt this afternoon and performed virtual telephone office visit. Time spent 15 minutes. She reports that since her A1C was higher she has made sig lifestyle and diet changes.   We discussed that her creatinine is now mildly elevated although h

## 2020-06-04 PROBLEM — G47.33 OSA (OBSTRUCTIVE SLEEP APNEA): Status: ACTIVE | Noted: 2020-06-04

## 2020-06-19 RX ORDER — SPIRONOLACTONE 50 MG/1
TABLET, FILM COATED ORAL
Qty: 90 TABLET | Refills: 0 | Status: SHIPPED | OUTPATIENT
Start: 2020-06-19 | End: 2020-09-28

## 2020-07-13 RX ORDER — FUROSEMIDE 20 MG/1
20 TABLET ORAL DAILY
Qty: 90 TABLET | Refills: 1 | Status: SHIPPED | OUTPATIENT
Start: 2020-07-13 | End: 2020-08-04

## 2020-09-03 ENCOUNTER — HOSPITAL ENCOUNTER (OUTPATIENT)
Age: 55
Discharge: HOME | End: 2020-09-03
Payer: COMMERCIAL

## 2020-09-03 ENCOUNTER — TELEPHONE (OUTPATIENT)
Dept: SCHEDULING | Age: 55
End: 2020-09-03

## 2020-09-03 VITALS
OXYGEN SATURATION: 97 % | WEIGHT: 225 LBS | TEMPERATURE: 98 F | DIASTOLIC BLOOD PRESSURE: 85 MMHG | HEART RATE: 98 BPM | SYSTOLIC BLOOD PRESSURE: 145 MMHG | HEIGHT: 64.5 IN | BODY MASS INDEX: 37.95 KG/M2 | RESPIRATION RATE: 18 BRPM

## 2020-09-03 DIAGNOSIS — J02.0 STREPTOCOCCAL SORE THROAT: Primary | ICD-10-CM

## 2020-09-03 LAB — POCT RAPID STREP: POSITIVE

## 2020-09-03 PROCEDURE — 87430 STREP A AG IA: CPT | Performed by: PHYSICIAN ASSISTANT

## 2020-09-03 PROCEDURE — 99213 OFFICE O/P EST LOW 20 MIN: CPT | Performed by: PHYSICIAN ASSISTANT

## 2020-09-03 RX ORDER — AMOXICILLIN 875 MG/1
875 TABLET, COATED ORAL 2 TIMES DAILY
Qty: 14 TABLET | Refills: 0 | Status: SHIPPED | OUTPATIENT
Start: 2020-09-03 | End: 2020-09-10

## 2020-09-03 RX ORDER — FLUCONAZOLE 150 MG/1
150 TABLET ORAL ONCE
Qty: 1 TABLET | Refills: 0 | Status: SHIPPED | OUTPATIENT
Start: 2020-09-03 | End: 2020-09-03

## 2020-09-03 NOTE — ED PROVIDER NOTES
Patient Seen in: [facility]      History   Patient presents with:  Sore Throat    Stated Complaint: sore throat x 3 days     HPI    13-year-old female presents to the clinic for evaluation of sore throat for 3 days.   Anthony negative except as noted above.     Physical Exam     ED Triage Vitals [09/03/20 1837]   /85   Pulse 98   Resp 18   Temp 98.2 °F (36.8 °C)   Temp src Temporal   SpO2 97 %   O2 Device None (Room air)       Current:/85   Pulse 98   Temp 98.2 °F (3 recheck. Return precautions given. She verbalized understanding and agreement to discharge plan. Patient also requested a tablet of Diflucan since she always gets yeast infections with her antibiotic use.               Disposition and Plan     Clinical I

## 2020-09-03 NOTE — ED INITIAL ASSESSMENT (HPI)
Pt c/o sore throat for 3 days. Pt states her daughter had a positive strep a few days ago. Pt states she tested negative for covid on 08/31/2020.

## 2020-09-28 RX ORDER — SPIRONOLACTONE 50 MG/1
TABLET, FILM COATED ORAL
Qty: 90 TABLET | Refills: 0 | Status: SHIPPED | OUTPATIENT
Start: 2020-09-28 | End: 2021-01-14

## 2020-09-28 NOTE — TELEPHONE ENCOUNTER
Spironolactone 50mg Take 1 tablet daily.      Last filled-6/19/20 for 3 months      LOV-5/27/20  NOV-11/19/20

## 2020-12-29 ENCOUNTER — LAB ENCOUNTER (OUTPATIENT)
Dept: LAB | Age: 55
End: 2020-12-29
Attending: INTERNAL MEDICINE
Payer: COMMERCIAL

## 2020-12-29 DIAGNOSIS — R80.9 MICROALBUMINURIA: ICD-10-CM

## 2020-12-29 DIAGNOSIS — N18.2 CKD (CHRONIC KIDNEY DISEASE) STAGE 2, GFR 60-89 ML/MIN: ICD-10-CM

## 2020-12-29 PROCEDURE — 82043 UR ALBUMIN QUANTITATIVE: CPT

## 2020-12-29 PROCEDURE — 82570 ASSAY OF URINE CREATININE: CPT

## 2021-01-04 ENCOUNTER — OFFICE VISIT (OUTPATIENT)
Dept: NEPHROLOGY | Facility: CLINIC | Age: 56
End: 2021-01-04
Payer: COMMERCIAL

## 2021-01-04 VITALS — SYSTOLIC BLOOD PRESSURE: 128 MMHG | WEIGHT: 213 LBS | BODY MASS INDEX: 36 KG/M2 | DIASTOLIC BLOOD PRESSURE: 70 MMHG

## 2021-01-04 DIAGNOSIS — R80.9 MICROALBUMINURIA: Primary | ICD-10-CM

## 2021-01-04 DIAGNOSIS — R60.0 LOWER EXTREMITY EDEMA: ICD-10-CM

## 2021-01-04 PROCEDURE — 99214 OFFICE O/P EST MOD 30 MIN: CPT | Performed by: INTERNAL MEDICINE

## 2021-01-04 PROCEDURE — 3074F SYST BP LT 130 MM HG: CPT | Performed by: INTERNAL MEDICINE

## 2021-01-04 PROCEDURE — 3078F DIAST BP <80 MM HG: CPT | Performed by: INTERNAL MEDICINE

## 2021-01-04 NOTE — PROGRESS NOTES
Nephrology Progress Note      Tina Lincoln is a 63year old female. HPI:   Patient presents with:   Other: microalbuminuria      Payal Campos was seen in nephrology clinic today in follow-up for management of microalbuminuria in the setting of longst No       Medications (Active prior to today's visit):  Current Outpatient Medications   Medication Sig Dispense Refill   • topiramate 100 MG Oral Tab Take 1 tablet (100 mg total) by mouth 2 (two) times daily.  60 tablet 3   • FENOFIBRATE 145 MG Oral Tab MADDI Inject 20 units TID before meals and sliding scale. MDD= 75 units.  75 mL 1   • Continuous Blood Gluc Sensor (FREESTYLE ROSENDA 14 DAY SENSOR) Does not apply Misc Change every 14 days 6 each 3   • clotrimazole-betamethasone 1-0.05 % External Cream APPLY TO AF (H) 10/31/2020    CREATSERUM 0.80 10/31/2020    ANIONGAP 3 11/12/2019     07/16/2016    GFRNAA 70 11/12/2019    GFRAA 81 11/12/2019    CA 9.4 10/31/2020    OSMOCALC 299 (H) 11/12/2019    ALKPHO 60 10/31/2020    AST 34 (H) 10/31/2020    ALT 26 10/31/ microalbumin to creatinine ratio is significantly better than when we last checked. She reports anaphylaxis to ACE inhibitors and also an allergy to angiotensin receptor blockers and therefore cannot use these agents as protein lowering medications.   She

## 2021-01-14 RX ORDER — SPIRONOLACTONE 50 MG/1
50 TABLET, FILM COATED ORAL DAILY
Qty: 90 TABLET | Refills: 1 | Status: SHIPPED | OUTPATIENT
Start: 2021-01-14 | End: 2021-09-27

## 2021-09-27 RX ORDER — SPIRONOLACTONE 50 MG/1
50 TABLET, FILM COATED ORAL DAILY
Qty: 90 TABLET | Refills: 0 | Status: SHIPPED | OUTPATIENT
Start: 2021-09-27

## 2022-01-26 PROBLEM — G89.29 CHRONIC PAIN OF RIGHT ANKLE: Status: ACTIVE | Noted: 2022-01-26

## 2022-01-26 PROBLEM — M25.571 CHRONIC PAIN OF RIGHT ANKLE: Status: ACTIVE | Noted: 2022-01-26

## 2022-02-04 ENCOUNTER — TELEPHONE (OUTPATIENT)
Dept: NEPHROLOGY | Facility: CLINIC | Age: 57
End: 2022-02-04

## 2022-02-04 RX ORDER — FUROSEMIDE 20 MG/1
20 TABLET ORAL DAILY
Qty: 30 TABLET | Refills: 5 | Status: SHIPPED | OUTPATIENT
Start: 2022-02-04 | End: 2022-03-22

## 2022-02-14 RX ORDER — SPIRONOLACTONE 50 MG/1
50 TABLET, FILM COATED ORAL DAILY
Qty: 90 TABLET | Refills: 0 | OUTPATIENT
Start: 2022-02-14

## 2022-03-17 ENCOUNTER — PATIENT MESSAGE (OUTPATIENT)
Dept: NEPHROLOGY | Facility: CLINIC | Age: 57
End: 2022-03-17

## 2022-03-21 ENCOUNTER — LAB ENCOUNTER (OUTPATIENT)
Dept: LAB | Age: 57
End: 2022-03-21
Attending: INTERNAL MEDICINE
Payer: COMMERCIAL

## 2022-03-21 ENCOUNTER — OFFICE VISIT (OUTPATIENT)
Dept: NEPHROLOGY | Facility: CLINIC | Age: 57
End: 2022-03-21
Payer: COMMERCIAL

## 2022-03-21 VITALS — WEIGHT: 225 LBS | SYSTOLIC BLOOD PRESSURE: 136 MMHG | DIASTOLIC BLOOD PRESSURE: 78 MMHG | BODY MASS INDEX: 39 KG/M2

## 2022-03-21 DIAGNOSIS — R60.0 LOWER EXTREMITY EDEMA: ICD-10-CM

## 2022-03-21 DIAGNOSIS — R80.9 MICROALBUMINURIA: ICD-10-CM

## 2022-03-21 DIAGNOSIS — R80.9 MICROALBUMINURIA: Primary | ICD-10-CM

## 2022-03-21 LAB
CREAT UR-SCNC: 104 MG/DL
MICROALBUMIN UR-MCNC: 111 MG/DL
MICROALBUMIN/CREAT 24H UR-RTO: 1067.3 UG/MG (ref ?–30)

## 2022-03-21 PROCEDURE — 99214 OFFICE O/P EST MOD 30 MIN: CPT | Performed by: INTERNAL MEDICINE

## 2022-03-21 PROCEDURE — 82570 ASSAY OF URINE CREATININE: CPT | Performed by: INTERNAL MEDICINE

## 2022-03-21 PROCEDURE — 3078F DIAST BP <80 MM HG: CPT | Performed by: INTERNAL MEDICINE

## 2022-03-21 PROCEDURE — 3075F SYST BP GE 130 - 139MM HG: CPT | Performed by: INTERNAL MEDICINE

## 2022-03-21 PROCEDURE — 3060F POS MICROALBUMINURIA REV: CPT | Performed by: FAMILY MEDICINE

## 2022-03-21 PROCEDURE — 82043 UR ALBUMIN QUANTITATIVE: CPT | Performed by: INTERNAL MEDICINE

## 2022-03-21 RX ORDER — FUROSEMIDE 20 MG/1
20 TABLET ORAL DAILY
Qty: 90 TABLET | Refills: 3 | Status: SHIPPED | OUTPATIENT
Start: 2022-03-21

## 2022-03-21 RX ORDER — SPIRONOLACTONE 50 MG/1
50 TABLET, FILM COATED ORAL DAILY
Qty: 90 TABLET | Refills: 3 | Status: SHIPPED | OUTPATIENT
Start: 2022-03-21

## 2022-03-22 PROCEDURE — 3046F HEMOGLOBIN A1C LEVEL >9.0%: CPT | Performed by: FAMILY MEDICINE

## 2022-10-03 ENCOUNTER — OFFICE VISIT (OUTPATIENT)
Dept: FAMILY MEDICINE CLINIC | Facility: CLINIC | Age: 57
End: 2022-10-03
Payer: COMMERCIAL

## 2022-10-03 VITALS
DIASTOLIC BLOOD PRESSURE: 67 MMHG | SYSTOLIC BLOOD PRESSURE: 148 MMHG | OXYGEN SATURATION: 98 % | HEART RATE: 82 BPM | TEMPERATURE: 98 F

## 2022-10-03 DIAGNOSIS — L50.9 URTICARIA: Primary | ICD-10-CM

## 2022-10-03 PROCEDURE — 3077F SYST BP >= 140 MM HG: CPT | Performed by: FAMILY MEDICINE

## 2022-10-03 PROCEDURE — 3078F DIAST BP <80 MM HG: CPT | Performed by: FAMILY MEDICINE

## 2022-10-03 PROCEDURE — 99213 OFFICE O/P EST LOW 20 MIN: CPT | Performed by: FAMILY MEDICINE

## 2022-10-03 NOTE — PATIENT INSTRUCTIONS
Restart claritin daily. Add 1/2 dose of benadryl every 6-8 hours as needed for breakthrough itching. Avoid any new lotions or detergents that you may have started using in the last few weeks. You may re-introduce them gradually after the rash subsides which may help determine if they were the source of the rash. If no better in 2-3 days, follow-up with PCP for further evaluation.

## 2023-02-28 ENCOUNTER — OFFICE VISIT (OUTPATIENT)
Dept: FAMILY MEDICINE CLINIC | Facility: CLINIC | Age: 58
End: 2023-02-28
Payer: COMMERCIAL

## 2023-02-28 VITALS
RESPIRATION RATE: 18 BRPM | HEART RATE: 92 BPM | TEMPERATURE: 98 F | HEIGHT: 64 IN | DIASTOLIC BLOOD PRESSURE: 98 MMHG | SYSTOLIC BLOOD PRESSURE: 146 MMHG | WEIGHT: 224 LBS | OXYGEN SATURATION: 96 % | BODY MASS INDEX: 38.24 KG/M2

## 2023-02-28 DIAGNOSIS — R10.13 EPIGASTRIC PAIN: ICD-10-CM

## 2023-02-28 DIAGNOSIS — R19.7 DIARRHEA, UNSPECIFIED TYPE: Primary | ICD-10-CM

## 2023-02-28 PROCEDURE — 99213 OFFICE O/P EST LOW 20 MIN: CPT | Performed by: NURSE PRACTITIONER

## 2023-02-28 PROCEDURE — 3080F DIAST BP >= 90 MM HG: CPT | Performed by: NURSE PRACTITIONER

## 2023-02-28 PROCEDURE — 3008F BODY MASS INDEX DOCD: CPT | Performed by: NURSE PRACTITIONER

## 2023-02-28 PROCEDURE — 3077F SYST BP >= 140 MM HG: CPT | Performed by: NURSE PRACTITIONER

## 2023-03-09 ENCOUNTER — OFFICE VISIT (OUTPATIENT)
Dept: FAMILY MEDICINE CLINIC | Facility: CLINIC | Age: 58
End: 2023-03-09
Payer: COMMERCIAL

## 2023-03-09 VITALS
BODY MASS INDEX: 38.58 KG/M2 | TEMPERATURE: 98 F | RESPIRATION RATE: 16 BRPM | OXYGEN SATURATION: 99 % | HEIGHT: 64 IN | WEIGHT: 226 LBS | DIASTOLIC BLOOD PRESSURE: 72 MMHG | HEART RATE: 106 BPM | SYSTOLIC BLOOD PRESSURE: 132 MMHG

## 2023-03-09 DIAGNOSIS — J02.0 STREP PHARYNGITIS: Primary | ICD-10-CM

## 2023-03-09 DIAGNOSIS — J02.9 SORE THROAT: ICD-10-CM

## 2023-03-09 LAB
CONTROL LINE PRESENT WITH A CLEAR BACKGROUND (YES/NO): YES YES/NO
STREP GRP A CUL-SCR: POSITIVE

## 2023-03-09 PROCEDURE — 99213 OFFICE O/P EST LOW 20 MIN: CPT | Performed by: NURSE PRACTITIONER

## 2023-03-09 PROCEDURE — 3075F SYST BP GE 130 - 139MM HG: CPT | Performed by: NURSE PRACTITIONER

## 2023-03-09 PROCEDURE — 3078F DIAST BP <80 MM HG: CPT | Performed by: NURSE PRACTITIONER

## 2023-03-09 PROCEDURE — 87880 STREP A ASSAY W/OPTIC: CPT | Performed by: NURSE PRACTITIONER

## 2023-03-09 PROCEDURE — 3008F BODY MASS INDEX DOCD: CPT | Performed by: NURSE PRACTITIONER

## 2023-03-09 RX ORDER — AMOXICILLIN 500 MG/1
500 CAPSULE ORAL 2 TIMES DAILY
Qty: 20 CAPSULE | Refills: 0 | Status: SHIPPED | OUTPATIENT
Start: 2023-03-09 | End: 2023-03-19

## 2023-03-13 DIAGNOSIS — R80.9 MICROALBUMINURIA: Primary | ICD-10-CM

## 2023-03-16 ENCOUNTER — LAB REQUISITION (OUTPATIENT)
Dept: LAB | Facility: HOSPITAL | Age: 58
End: 2023-03-16
Payer: COMMERCIAL

## 2023-03-16 DIAGNOSIS — N60.91 UNSPECIFIED BENIGN MAMMARY DYSPLASIA OF RIGHT BREAST: ICD-10-CM

## 2023-03-16 PROCEDURE — 88305 TISSUE EXAM BY PATHOLOGIST: CPT | Performed by: SURGERY

## 2023-03-22 ENCOUNTER — LAB ENCOUNTER (OUTPATIENT)
Dept: LAB | Age: 58
End: 2023-03-22
Attending: INTERNAL MEDICINE
Payer: COMMERCIAL

## 2023-03-22 DIAGNOSIS — R80.9 MICROALBUMINURIA: ICD-10-CM

## 2023-03-22 LAB
CREAT UR-SCNC: 142 MG/DL
MICROALBUMIN UR-MCNC: 123 MG/DL
MICROALBUMIN/CREAT 24H UR-RTO: 866.2 UG/MG (ref ?–30)

## 2023-03-22 PROCEDURE — 82043 UR ALBUMIN QUANTITATIVE: CPT

## 2023-03-22 PROCEDURE — 3060F POS MICROALBUMINURIA REV: CPT | Performed by: INTERNAL MEDICINE

## 2023-03-22 PROCEDURE — 82570 ASSAY OF URINE CREATININE: CPT

## 2023-03-23 ENCOUNTER — OFFICE VISIT (OUTPATIENT)
Dept: NEPHROLOGY | Facility: CLINIC | Age: 58
End: 2023-03-23
Payer: COMMERCIAL

## 2023-03-23 VITALS — DIASTOLIC BLOOD PRESSURE: 64 MMHG | BODY MASS INDEX: 39 KG/M2 | WEIGHT: 226 LBS | SYSTOLIC BLOOD PRESSURE: 110 MMHG

## 2023-03-23 DIAGNOSIS — R80.9 MICROALBUMINURIA: Primary | ICD-10-CM

## 2023-03-23 DIAGNOSIS — R60.0 LOWER EXTREMITY EDEMA: ICD-10-CM

## 2023-03-23 PROCEDURE — 3074F SYST BP LT 130 MM HG: CPT | Performed by: INTERNAL MEDICINE

## 2023-03-23 PROCEDURE — 99214 OFFICE O/P EST MOD 30 MIN: CPT | Performed by: INTERNAL MEDICINE

## 2023-03-23 PROCEDURE — 3078F DIAST BP <80 MM HG: CPT | Performed by: INTERNAL MEDICINE

## 2023-03-23 RX ORDER — GLIMEPIRIDE 4 MG/1
TABLET ORAL
COMMUNITY
Start: 2023-02-08

## 2023-06-14 RX ORDER — SPIRONOLACTONE 50 MG/1
50 TABLET, FILM COATED ORAL DAILY
Qty: 90 TABLET | Refills: 1 | Status: SHIPPED | OUTPATIENT
Start: 2023-06-14

## 2023-12-07 RX ORDER — FUROSEMIDE 20 MG/1
20 TABLET ORAL DAILY
Qty: 90 TABLET | Refills: 0 | Status: SHIPPED | OUTPATIENT
Start: 2023-12-07

## 2024-01-05 RX ORDER — SPIRONOLACTONE 50 MG/1
50 TABLET, FILM COATED ORAL DAILY
Qty: 90 TABLET | Refills: 1 | Status: SHIPPED | OUTPATIENT
Start: 2024-01-05

## 2024-01-12 ENCOUNTER — HOSPITAL ENCOUNTER (EMERGENCY)
Age: 59
Discharge: HOME | End: 2024-01-12
Payer: COMMERCIAL

## 2024-01-12 VITALS
OXYGEN SATURATION: 98 % | RESPIRATION RATE: 18 BRPM | HEIGHT: 63 IN | BODY MASS INDEX: 39.16 KG/M2 | HEART RATE: 76 BPM | DIASTOLIC BLOOD PRESSURE: 89 MMHG | TEMPERATURE: 98 F | SYSTOLIC BLOOD PRESSURE: 155 MMHG | WEIGHT: 221 LBS

## 2024-01-12 DIAGNOSIS — R11.2 NAUSEA AND VOMITING IN ADULT: ICD-10-CM

## 2024-01-12 DIAGNOSIS — N28.9 RENAL INSUFFICIENCY: ICD-10-CM

## 2024-01-12 DIAGNOSIS — E11.65 UNCONTROLLED TYPE 2 DIABETES MELLITUS WITH HYPERGLYCEMIA (HCC): ICD-10-CM

## 2024-01-12 DIAGNOSIS — J34.89 SINUS PRESSURE: Primary | ICD-10-CM

## 2024-01-12 LAB
ANION GAP SERPL CALC-SCNC: 10 MMOL/L (ref 0–18)
BASOPHILS # BLD AUTO: 0.02 X10(3) UL (ref 0–0.2)
BASOPHILS NFR BLD AUTO: 0.3 %
BUN BLD-MCNC: 26 MG/DL (ref 9–23)
CALCIUM BLD-MCNC: 9.6 MG/DL (ref 8.5–10.1)
CHLORIDE SERPL-SCNC: 102 MMOL/L (ref 98–112)
CO2 SERPL-SCNC: 23 MMOL/L (ref 21–32)
CREAT BLD-MCNC: 1.14 MG/DL
EGFRCR SERPLBLD CKD-EPI 2021: 55 ML/MIN/1.73M2 (ref 60–?)
EOSINOPHIL # BLD AUTO: 0.09 X10(3) UL (ref 0–0.7)
EOSINOPHIL NFR BLD AUTO: 1.2 %
ERYTHROCYTE [DISTWIDTH] IN BLOOD BY AUTOMATED COUNT: 14.1 %
GLUCOSE BLD-MCNC: 328 MG/DL (ref 70–99)
GLUCOSE BLD-MCNC: 347 MG/DL (ref 70–99)
HCT VFR BLD AUTO: 43.9 %
HGB BLD-MCNC: 14.4 G/DL
IMM GRANULOCYTES # BLD AUTO: 0.02 X10(3) UL (ref 0–1)
IMM GRANULOCYTES NFR BLD: 0.3 %
LYMPHOCYTES # BLD AUTO: 2.05 X10(3) UL (ref 1–4)
LYMPHOCYTES NFR BLD AUTO: 26.7 %
MCH RBC QN AUTO: 27.8 PG (ref 26–34)
MCHC RBC AUTO-ENTMCNC: 32.8 G/DL (ref 31–37)
MCV RBC AUTO: 84.7 FL
MONOCYTES # BLD AUTO: 0.32 X10(3) UL (ref 0.1–1)
MONOCYTES NFR BLD AUTO: 4.2 %
NEUTROPHILS # BLD AUTO: 5.18 X10 (3) UL (ref 1.5–7.7)
NEUTROPHILS # BLD AUTO: 5.18 X10(3) UL (ref 1.5–7.7)
NEUTROPHILS NFR BLD AUTO: 67.3 %
OSMOLALITY SERPL CALC.SUM OF ELEC: 299 MOSM/KG (ref 275–295)
PLATELET # BLD AUTO: 312 10(3)UL (ref 150–450)
POCT INFLUENZA A: NEGATIVE
POCT INFLUENZA B: NEGATIVE
POTASSIUM SERPL-SCNC: 4.4 MMOL/L (ref 3.5–5.1)
RBC # BLD AUTO: 5.18 X10(6)UL
SARS-COV-2 RNA RESP QL NAA+PROBE: NOT DETECTED
SODIUM SERPL-SCNC: 135 MMOL/L (ref 136–145)
WBC # BLD AUTO: 7.7 X10(3) UL (ref 4–11)

## 2024-01-12 PROCEDURE — 96374 THER/PROPH/DIAG INJ IV PUSH: CPT

## 2024-01-12 PROCEDURE — 96361 HYDRATE IV INFUSION ADD-ON: CPT

## 2024-01-12 PROCEDURE — 85025 COMPLETE CBC W/AUTO DIFF WBC: CPT | Performed by: NURSE PRACTITIONER

## 2024-01-12 PROCEDURE — 96375 TX/PRO/DX INJ NEW DRUG ADDON: CPT

## 2024-01-12 PROCEDURE — 99284 EMERGENCY DEPT VISIT MOD MDM: CPT

## 2024-01-12 PROCEDURE — 80048 BASIC METABOLIC PNL TOTAL CA: CPT | Performed by: NURSE PRACTITIONER

## 2024-01-12 PROCEDURE — S0119 ONDANSETRON 4 MG: HCPCS | Performed by: NURSE PRACTITIONER

## 2024-01-12 PROCEDURE — 87502 INFLUENZA DNA AMP PROBE: CPT | Performed by: NURSE PRACTITIONER

## 2024-01-12 PROCEDURE — 82962 GLUCOSE BLOOD TEST: CPT

## 2024-01-12 RX ORDER — DIPHENHYDRAMINE HYDROCHLORIDE 50 MG/ML
25 INJECTION INTRAMUSCULAR; INTRAVENOUS ONCE
Status: COMPLETED | OUTPATIENT
Start: 2024-01-12 | End: 2024-01-12

## 2024-01-12 RX ORDER — NYSTATIN 10B UNIT
POWDER (EA) MISCELLANEOUS
COMMUNITY

## 2024-01-12 RX ORDER — ONDANSETRON 4 MG/1
4 TABLET, ORALLY DISINTEGRATING ORAL ONCE
Status: COMPLETED | OUTPATIENT
Start: 2024-01-12 | End: 2024-01-12

## 2024-01-12 RX ORDER — CETIRIZINE HYDROCHLORIDE 10 MG/1
10 TABLET ORAL DAILY
COMMUNITY

## 2024-01-12 RX ORDER — FAMOTIDINE 20 MG/1
20 TABLET, FILM COATED ORAL 2 TIMES DAILY
COMMUNITY

## 2024-01-12 RX ORDER — ONDANSETRON 2 MG/ML
4 INJECTION INTRAMUSCULAR; INTRAVENOUS ONCE
Status: COMPLETED | OUTPATIENT
Start: 2024-01-12 | End: 2024-01-12

## 2024-01-12 RX ORDER — FEXOFENADINE HCL 180 MG/1
180 TABLET ORAL DAILY
COMMUNITY

## 2024-01-12 RX ORDER — METOPROLOL SUCCINATE 25 MG/1
25 TABLET, EXTENDED RELEASE ORAL DAILY
COMMUNITY

## 2024-01-12 RX ORDER — ONDANSETRON 4 MG/1
4 TABLET, ORALLY DISINTEGRATING ORAL EVERY 4 HOURS PRN
Qty: 30 TABLET | Refills: 0 | Status: SHIPPED | OUTPATIENT
Start: 2024-01-12

## 2024-01-12 RX ORDER — ROSUVASTATIN CALCIUM 10 MG/1
10 TABLET, COATED ORAL NIGHTLY
COMMUNITY

## 2024-01-12 RX ORDER — THIAMINE HCL 100 MG
TABLET ORAL
COMMUNITY

## 2024-01-13 NOTE — ED PROVIDER NOTES
History     Chief Complaint   Patient presents with    Flu       Subjective:   HPI    Karen Mtz, 58 year old female with notable medical history of obesity, T2DM, ALEXY, HTN who presents with Right ear pressure and Right frontal sinus pressure. Patient reports s/s started a couple days ago with associated nausea, fatigue, decreased appetite. Denies fever, chills, diarrhea, abdominal pain, urinary changes. Nothing taken for symptoms pta.     Patient also reports acute on chronic neck pain that she has had for a long time, and relates it to a crook in her neck. Admits to minimal water intake.      Objective:   Past Medical History:   Diagnosis Date    Anemia     DIABETES 2005    Diabetes mellitus type II, uncontrolled     Disorder of liver     FATTY LIVER    Diverticulosis     Esophageal reflux     Extrinsic asthma, unspecified     ALLERGY INDUCED    HYPERLIPIDEMIA     HYPERTRIGLYCERIDEMIA     Hypertriglyceridemia     ALEXY (obstructive sleep apnea) 5/23/2020 HST    AHI 30 Supine AHI 55 non-supine AHI 24 [place] 79%    Other and unspecified hyperlipidemia     Type II or unspecified type diabetes mellitus without mention of complication, not stated as uncontrolled     Unspecified essential hypertension             No current facility-administered medications on file prior to encounter.     Current Outpatient Medications on File Prior to Encounter   Medication Sig Dispense Refill    Nystatin Does not apply Powder       fluticasone propionate 50 MCG/ACT Inhalation Aerosol Powder, Breath Activated Inhale 1 puff into the lungs 2 (two) times daily.      cetirizine 10 MG Oral Tab Take 1 tablet (10 mg total) by mouth daily.      metoprolol succinate ER 25 MG Oral Tablet 24 Hr Take 1 tablet (25 mg total) by mouth daily.      Magnesium 500 MG Oral Tab Take by mouth.      fexofenadine 180 MG Oral Tab Take 1 tablet (180 mg total) by mouth daily.      rosuvastatin 10 MG Oral Tab Take 1 tablet (10 mg total) by mouth  nightly.      famotidine 20 MG Oral Tab Take 1 tablet (20 mg total) by mouth 2 (two) times daily.      spironolactone 50 MG Oral Tab Take 1 tablet (50 mg total) by mouth daily. 90 tablet 1    furosemide 20 MG Oral Tab Take 1 tablet (20 mg total) by mouth daily. 90 tablet 0    TOPIRAMATE 100 MG Oral Tab Take 1 tablet (100 mg total) by mouth 2 (two) times daily. (Patient taking differently: Take 0.5 tablets (50 mg total) by mouth 2 (two) times daily.) 180 tablet 0    OMEPRAZOLE 40 MG Oral Capsule Delayed Release TAKE ONE CAPSULE BY MOUTH ONCE DAILY 90 capsule 0    Continuous Blood Gluc  (FREESTYLE ROSENDA 14 DAY READER) Does not apply Device 1 Units as needed. 1 each 0    albuterol (PROAIR HFA) 108 (90 Base) MCG/ACT Inhalation Aero Soln Inhale 2 puffs into the lungs every 6 (six) hours as needed for Wheezing or Shortness of Breath. 3 each 0    Insulin Glargine, 2 Unit Dial, (TOUJEO MAX SOLOSTAR) 300 UNIT/ML Subcutaneous Solution Pen-injector Inject 100 Units into the skin daily. (Patient taking differently: Inject 110 Units into the skin daily.) 75 mL 1    ezetimibe 10 MG Oral Tab Take 1 tablet (10 mg total) by mouth daily. 90 tablet 0    Insulin Aspart Pen (NOVOLOG FLEXPEN) 100 UNIT/ML Subcutaneous Solution Pen-injector Take 10 units with dinner (Patient taking differently: Sliding scale during the day TID  10 units at dinner) 3 each 2    FENOFIBRATE 145 MG Oral Tab TAKE ONE TABLET BY MOUTH ONCE DAILY  (Patient taking differently: 1 tablet (145 mg total) daily.) 90 tablet 0    clotrimazole-betamethasone 1-0.05 % External Cream APPLY TO AFFECTED AREA DAILY 15 g 0    Insulin Pen Needle (NOVOFINE PLUS) 32G X 4 MM Does not apply Misc Use 4 per day 400 each 3    glimepiride 4 MG Oral Tab  (Patient not taking: Reported on 1/12/2024)      Levocetirizine Dihydrochloride (XYZAL OR) Take by mouth. (Patient not taking: Reported on 1/12/2024)      estradiol (ESTRACE) 0.1 MG/GM Vaginal Cream Use 1 g nightly x 2 wks, then  use 1 g twice a week (Patient not taking: Reported on 2024) 1 each 4    Continuous Blood Gluc Sensor (FREESTYLE ROSENDA 14 DAY SENSOR) Does not apply Misc 1 each by Does not apply route every 14 (fourteen) days. 2 each 5    ergocalciferol 1.25 MG (78907 UT) Oral Cap Take 1 capsule (50,000 Units total) by mouth once a week. (Patient not taking: Reported on 3/22/2022) 12 capsule 3    Icosapent Ethyl (VASCEPA OR) Take by mouth. (Patient not taking: Reported on 2024)      Glucose Blood (EVANGELINA CONTOUR NEXT TEST) In Vitro Strip Test 4 times daily (Patient not taking: Reported on 2024) 400 each 3         Past Surgical History:   Procedure Laterality Date          COLONOSCOPY      COLONOSCOPY,DIAGNOSTIC N/A 10/10/2015    Procedure: ESOPHAGOGASTRODUODENOSCOPY, COLONOSCOPY, POSSIBLE BIOPSY, POSSIBLE POLYPECTOMY 22161, 40215;  Surgeon: Ishan Fournier MD;  Location: [facility]    LUMPECTOMY RIGHT Right 2023    OTHER SURGICAL HISTORY   [facility]    ENDOSCOPY X'S 3    UPPER GI ENDOSCOPY,DIAGNOSIS N/A 10/10/2015    Procedure: ESOPHAGOGASTRODUODENOSCOPY, COLONOSCOPY, POSSIBLE BIOPSY, POSSIBLE POLYPECTOMY 06755, 07028;  Surgeon: Ishan Fournier MD;  Location: [facility]                Social History     Socioeconomic History    Marital status:      Spouse name:     Number of children: 2   Occupational History    Occupation:  worker   Tobacco Use    Smoking status: Never    Smokeless tobacco: Never   Vaping Use    Vaping Use: Never used   Substance and Sexual Activity    Alcohol use: No     Alcohol/week: 0.0 standard drinks of alcohol    Drug use: No    Sexual activity: Yes     Partners: Male              Review of Systems   Constitutional:  Positive for appetite change and fatigue. Negative for fever.   HENT:  Positive for ear pain and sinus pressure. Negative for sore throat.    Gastrointestinal:  Positive for nausea. Negative for  abdominal pain and diarrhea.   All other systems reviewed and are negative.        Constitutional and vital signs reviewed.      All other systems reviewed and negative except as noted above.    I have reviewed the family history, social history, allergies, and outpatient medications.     History reviewed from EMR: Encounters, problem list, allergies, medications      Physical Exam     ED Triage Vitals [01/12/24 1944]   BP (!) 194/97   Pulse 90   Resp 18   Temp 97.9 °F (36.6 °C)   Temp src    SpO2 99 %   O2 Device None (Room air)       Current:/89   Pulse 90   Temp 97.9 °F (36.6 °C)   Resp 18   Ht 160 cm (5' 3\")   Wt 100.2 kg   LMP 08/08/2015   SpO2 99%   BMI 39.15 kg/m²       Physical Exam  Vitals and nursing note reviewed.   Constitutional:       General: She is not in acute distress.     Appearance: Normal appearance. She is morbidly obese. She is not ill-appearing or toxic-appearing.   HENT:      Head: Normocephalic and atraumatic.      Comments: No mastoid tenderness     Right Ear: Tympanic membrane, ear canal and external ear normal.      Left Ear: Tympanic membrane, ear canal and external ear normal.      Ears:      Comments: Benign ear exams     Nose: Nose normal.      Mouth/Throat:      Mouth: Mucous membranes are moist.   Eyes:      Extraocular Movements: Extraocular movements intact.      Conjunctiva/sclera: Conjunctivae normal.      Pupils: Pupils are equal, round, and reactive to light.   Cardiovascular:      Rate and Rhythm: Normal rate.      Pulses: Normal pulses.   Pulmonary:      Effort: Pulmonary effort is normal. No respiratory distress.   Musculoskeletal:         General: No swelling, tenderness or signs of injury. Normal range of motion.      Cervical back: Full passive range of motion without pain, normal range of motion and neck supple. Muscular tenderness present. No spinous process tenderness.   Skin:     General: Skin is warm and dry.      Capillary Refill: Capillary refill  takes less than 2 seconds.      Coloration: Skin is not jaundiced.   Neurological:      General: No focal deficit present.      Mental Status: She is alert and oriented to person, place, and time. Mental status is at baseline.   Psychiatric:         Mood and Affect: Mood normal.         Behavior: Behavior normal. Behavior is cooperative.         Thought Content: Thought content normal.         Judgment: Judgment normal.            ED Course     Labs Reviewed   BASIC METABOLIC PANEL (8) - Abnormal; Notable for the following components:       Result Value    Glucose 347 (*)     Sodium 135 (*)     BUN 26 (*)     Creatinine 1.14 (*)     Calculated Osmolality 299 (*)     eGFR-Cr 55 (*)     All other components within normal limits   POCT GLUCOSE - Abnormal; Notable for the following components:    POC Glucose 328 (*)     All other components within normal limits   RAPID SARS-COV-2 BY PCR - Normal   POCT FLU TEST - Normal    Narrative:     This assay is a rapid molecular in vitro test utilizing nucleic acid amplification of influenza A and B viral RNA.   CBC WITH DIFFERENTIAL WITH PLATELET    Narrative:     The following orders were created for panel order CBC With Differential With Platelet.                  Procedure                               Abnormality         Status                                     ---------                               -----------         ------                                     CBC W/ DIFFERENTIAL[[id number]]                              Final result                                                 Please view results for these tests on the individual orders.   CBC W/ DIFFERENTIAL     No orders to display       Vitals:    01/12/24 1944 01/12/24 2038   BP: (!) 194/97 155/89   Pulse: 90    Resp: 18    Temp: 97.9 °F (36.6 °C)    SpO2: 99%    Weight: 100.2 kg    Height: 160 cm (5' 3\")             [facility]        Karen Mtz, 58 year old female with medical history as noted above who presents  with viral symptoms   - Patient in NAD, BP elevated (will recheck)   - HPI and constellation of symptoms suggestive of viral etiology / sinus   - Less likely acute intraabdominal process given lack of abdomina pain and exam   - Flu and Covid swabs ordered   - Zofran ordered       ** See ED course for additional information on care provided / interventions / notable events throughout patient's encounter.    ** See below for home care instructions (if applicable)    ED Course as of 01/12/24 2128  ------------------------------------------------------------  Time: 01/12 2127  Comment: Labs notable for uncontrolled DM and renal insufficiency  Supportive care discussed  Advised close f/u with care team early next week  RTED precautions discussed       I have independently reviewed the radiology images, clinical lab results, and ECG tracings as described above (if applicable)        Medical Decision Making  Amount and/or Complexity of Data Reviewed  Labs: ordered. Decision-making details documented in ED Course.    Risk  OTC drugs.  Prescription drug management.        Disposition and Plan     Clinical Impression:  1. Sinus pressure    2. Uncontrolled type 2 diabetes mellitus with hyperglycemia (HCC)    3. Nausea and vomiting in adult    4. Renal insufficiency         Disposition:  Discharge  1/12/2024  9:27 pm    Follow-up:  Samira Link MD  [address]  [address]  [facility] [address] [phone]    Follow up  Close follow up with your care team next week          Medications Prescribed:  Current Discharge Medication List        START taking these medications    Details   ondansetron 4 MG Oral Tablet Dispersible Take 1 tablet (4 mg total) by mouth every 4 (four) hours as needed for Nausea.  Qty: 30 tablet, Refills: 0             The above patient (and/or guardian) was made aware that an appropriate evaluation has been performed, and that no additional testing is required at this time. In my medical judgment,  there is currently no evidence of an immediate life-threatening or surgical condition, therefore discharge is indicated at this time. The patient (and/or guardian) was advised that a small risk still exists that a serious condition could develop. The patient was instructed to arrange close follow-up with their primary care provider (or the referral provider given today). The patient received written and verbal instructions regarding their condition / concerns, demonstrated understanding, and is agreement with the outpatient treatment plan.        Home care instructions:    Your blood sugar was very elevated today. Contact your care team for close follow up next week as you may need adjustments in your medication regimen.    General:   - Wash hands often   - Disinfect your environment, linens, electronics, etc.   - Drink plenty of fluids (water, Pedialyte, etc.)   - Get plenty of rest and sleep with head elevated to help with sinus drainage and throat irritation   - Avoid having air blow on your face as this can worsen congestion / cough   - Do not share utensils or drinks   - Tylenol (adult: 650-1000mg) as needed for pain / body aches / fever   - You may benefit from taking a daily multivitamin   - You may benefit from Zinc (~20mg) a couple times a week   - You may benefit from Vitamin D daily (~2000u)   - Change toothbrush    GI upset care measures   - Zofran as needed for nausea   - Avoid spicy, greasy, fatty, fried, \"fast\" / \"dense\" foods   - Avoid dairy   - Avoid soda   - Avoid sugary foods / drinks   - Slowly progress diet as tolerated (liquids > smoothies > bananas / toast / apple sauce > others)   - Follow up with your doctor as needed        Mando Kerr, DNP, APRN, AGACNP-BC, FNP-C, CNL  Adult-Gerontology Acute Care & Family Nurse Practitioner  [facility]

## 2024-01-13 NOTE — ED INITIAL ASSESSMENT (HPI)
Pt reports right ear pain that is also causing pain to her right eye. Pt states she had an episode of vomiting yesterday.

## 2024-01-13 NOTE — DISCHARGE INSTRUCTIONS
Your blood sugar was very elevated today. Contact your care team for close follow up next week as you may need adjustments in your medication regimen.    General:   - Wash hands often   - Disinfect your environment, linens, electronics, etc.   - Drink plenty of fluids (water, Pedialyte, etc.)   - Get plenty of rest and sleep with head elevated to help with sinus drainage and throat irritation   - Avoid having air blow on your face as this can worsen congestion / cough   - Do not share utensils or drinks   - Tylenol (adult: 650-1000mg) as needed for pain / body aches / fever   - You may benefit from taking a daily multivitamin   - You may benefit from Zinc (~20mg) a couple times a week   - You may benefit from Vitamin D daily (~2000u)   - Change toothbrush    GI upset care measures   - Zofran as needed for nausea   - Avoid spicy, greasy, fatty, fried, \"fast\" / \"dense\" foods   - Avoid dairy   - Avoid soda   - Avoid sugary foods / drinks   - Slowly progress diet as tolerated (liquids > smoothies > bananas / toast / apple sauce > others)   - Follow up with your doctor as needed

## 2024-01-16 ENCOUNTER — APPOINTMENT (OUTPATIENT)
Dept: MRI IMAGING | Facility: HOSPITAL | Age: 59
End: 2024-01-16
Attending: EMERGENCY MEDICINE
Payer: COMMERCIAL

## 2024-01-16 ENCOUNTER — HOSPITAL ENCOUNTER (INPATIENT)
Facility: HOSPITAL | Age: 59
LOS: 3 days | Discharge: HOME | End: 2024-01-19
Attending: EMERGENCY MEDICINE | Admitting: INTERNAL MEDICINE
Payer: COMMERCIAL

## 2024-01-16 DIAGNOSIS — Z79.4 TYPE 2 DIABETES MELLITUS WITH MICROALBUMINURIA, WITH LONG-TERM CURRENT USE OF INSULIN (HCC): ICD-10-CM

## 2024-01-16 DIAGNOSIS — R90.0 INTRACRANIAL MASS: Primary | ICD-10-CM

## 2024-01-16 DIAGNOSIS — J01.00 ACUTE MAXILLARY SINUSITIS, RECURRENCE NOT SPECIFIED: ICD-10-CM

## 2024-01-16 DIAGNOSIS — Q28.3 CEREBRAL CAVERNOMA: ICD-10-CM

## 2024-01-16 DIAGNOSIS — E66.01 OBESITY, MORBID (HCC): ICD-10-CM

## 2024-01-16 DIAGNOSIS — G08 CEREBRAL VENOUS SINUS THROMBOSIS: ICD-10-CM

## 2024-01-16 DIAGNOSIS — E11.29 TYPE 2 DIABETES MELLITUS WITH MICROALBUMINURIA, WITH LONG-TERM CURRENT USE OF INSULIN (HCC): ICD-10-CM

## 2024-01-16 DIAGNOSIS — R80.9 TYPE 2 DIABETES MELLITUS WITH MICROALBUMINURIA, WITH LONG-TERM CURRENT USE OF INSULIN (HCC): ICD-10-CM

## 2024-01-16 PROBLEM — R73.9 HYPERGLYCEMIA: Status: ACTIVE | Noted: 2024-01-16

## 2024-01-16 LAB
ALBUMIN SERPL-MCNC: 3.3 G/DL (ref 3.4–5)
ALBUMIN/GLOB SERPL: 0.8 {RATIO} (ref 1–2)
ALP LIVER SERPL-CCNC: 79 U/L
ALT SERPL-CCNC: 24 U/L
ANION GAP SERPL CALC-SCNC: 10 MMOL/L (ref 0–18)
APTT PPP: 23.4 SECONDS (ref 23.3–35.6)
APTT PPP: 29.1 SECONDS (ref 23.3–35.6)
AST SERPL-CCNC: 15 U/L (ref 15–37)
BASOPHILS # BLD AUTO: 0.03 X10(3) UL (ref 0–0.2)
BASOPHILS NFR BLD AUTO: 0.4 %
BILIRUB SERPL-MCNC: 0.2 MG/DL (ref 0.1–2)
BUN BLD-MCNC: 19 MG/DL (ref 9–23)
CALCIUM BLD-MCNC: 9.2 MG/DL (ref 8.5–10.1)
CHLORIDE SERPL-SCNC: 106 MMOL/L (ref 98–112)
CO2 SERPL-SCNC: 22 MMOL/L (ref 21–32)
CREAT BLD-MCNC: 1.01 MG/DL
EGFRCR SERPLBLD CKD-EPI 2021: 64 ML/MIN/1.73M2 (ref 60–?)
EOSINOPHIL # BLD AUTO: 0.23 X10(3) UL (ref 0–0.7)
EOSINOPHIL NFR BLD AUTO: 2.7 %
ERYTHROCYTE [DISTWIDTH] IN BLOOD BY AUTOMATED COUNT: 13.8 %
GLOBULIN PLAS-MCNC: 4.1 G/DL (ref 2.8–4.4)
GLUCOSE BLD-MCNC: 231 MG/DL (ref 70–99)
GLUCOSE BLD-MCNC: 239 MG/DL (ref 70–99)
GLUCOSE BLD-MCNC: 288 MG/DL (ref 70–99)
HCT VFR BLD AUTO: 39.1 %
HGB BLD-MCNC: 13.2 G/DL
IMM GRANULOCYTES # BLD AUTO: 0.02 X10(3) UL (ref 0–1)
IMM GRANULOCYTES NFR BLD: 0.2 %
INR BLD: 1.03 (ref 0.8–1.2)
LYMPHOCYTES # BLD AUTO: 3.11 X10(3) UL (ref 1–4)
LYMPHOCYTES NFR BLD AUTO: 36.7 %
MCH RBC QN AUTO: 27.9 PG (ref 26–34)
MCHC RBC AUTO-ENTMCNC: 33.8 G/DL (ref 31–37)
MCV RBC AUTO: 82.7 FL
MONOCYTES # BLD AUTO: 0.39 X10(3) UL (ref 0.1–1)
MONOCYTES NFR BLD AUTO: 4.6 %
NEUTROPHILS # BLD AUTO: 4.69 X10 (3) UL (ref 1.5–7.7)
NEUTROPHILS # BLD AUTO: 4.69 X10(3) UL (ref 1.5–7.7)
NEUTROPHILS NFR BLD AUTO: 55.4 %
OSMOLALITY SERPL CALC.SUM OF ELEC: 296 MOSM/KG (ref 275–295)
PLATELET # BLD AUTO: 307 10(3)UL (ref 150–450)
POTASSIUM SERPL-SCNC: 4.4 MMOL/L (ref 3.5–5.1)
PROCALCITONIN SERPL-MCNC: <0.05 NG/ML (ref ?–0.16)
PROT SERPL-MCNC: 7.4 G/DL (ref 6.4–8.2)
PROTHROMBIN TIME: 13.6 SECONDS (ref 11.6–14.8)
RBC # BLD AUTO: 4.73 X10(6)UL
SARS-COV-2 RNA RESP QL NAA+PROBE: NOT DETECTED
SODIUM SERPL-SCNC: 138 MMOL/L (ref 136–145)
WBC # BLD AUTO: 8.5 X10(3) UL (ref 4–11)

## 2024-01-16 PROCEDURE — 99291 CRITICAL CARE FIRST HOUR: CPT | Performed by: NURSE PRACTITIONER

## 2024-01-16 PROCEDURE — 99291 CRITICAL CARE FIRST HOUR: CPT | Performed by: NEUROLOGICAL SURGERY

## 2024-01-16 PROCEDURE — 70553 MRI BRAIN STEM W/O & W/DYE: CPT | Performed by: EMERGENCY MEDICINE

## 2024-01-16 RX ORDER — ENOXAPARIN SODIUM 100 MG/ML
1 INJECTION SUBCUTANEOUS EVERY 12 HOURS SCHEDULED
Status: DISCONTINUED | OUTPATIENT
Start: 2024-01-16 | End: 2024-01-19

## 2024-01-16 RX ORDER — METOPROLOL SUCCINATE 25 MG/1
25 TABLET, EXTENDED RELEASE ORAL
Status: DISCONTINUED | OUTPATIENT
Start: 2024-01-17 | End: 2024-01-19

## 2024-01-16 RX ORDER — EZETIMIBE 10 MG/1
10 TABLET ORAL NIGHTLY
Status: DISCONTINUED | OUTPATIENT
Start: 2024-01-16 | End: 2024-01-19

## 2024-01-16 RX ORDER — HYDROCODONE BITARTRATE AND ACETAMINOPHEN 5; 325 MG/1; MG/1
1 TABLET ORAL EVERY 4 HOURS PRN
Status: DISCONTINUED | OUTPATIENT
Start: 2024-01-16 | End: 2024-01-19

## 2024-01-16 RX ORDER — HYDRALAZINE HYDROCHLORIDE 20 MG/ML
10 INJECTION INTRAMUSCULAR; INTRAVENOUS EVERY 4 HOURS PRN
Status: DISCONTINUED | OUTPATIENT
Start: 2024-01-16 | End: 2024-01-19

## 2024-01-16 RX ORDER — AMOXICILLIN AND CLAVULANATE POTASSIUM 875; 125 MG/1; MG/1
875 TABLET, FILM COATED ORAL ONCE
Status: COMPLETED | OUTPATIENT
Start: 2024-01-16 | End: 2024-01-16

## 2024-01-16 RX ORDER — ROSUVASTATIN CALCIUM 10 MG/1
10 TABLET, COATED ORAL NIGHTLY
Status: DISCONTINUED | OUTPATIENT
Start: 2024-01-16 | End: 2024-01-19

## 2024-01-16 RX ORDER — PANTOPRAZOLE SODIUM 40 MG/1
40 TABLET, DELAYED RELEASE ORAL
Status: DISCONTINUED | OUTPATIENT
Start: 2024-01-17 | End: 2024-01-19

## 2024-01-16 RX ORDER — AMOXICILLIN AND CLAVULANATE POTASSIUM 875; 125 MG/1; MG/1
1 TABLET, FILM COATED ORAL 2 TIMES DAILY
Qty: 20 TABLET | Refills: 0 | Status: SHIPPED | OUTPATIENT
Start: 2024-01-16 | End: 2024-01-26

## 2024-01-16 RX ORDER — GADOTERATE MEGLUMINE 376.9 MG/ML
30 INJECTION INTRAVENOUS
Status: COMPLETED | OUTPATIENT
Start: 2024-01-16 | End: 2024-01-16

## 2024-01-16 RX ORDER — ONDANSETRON 2 MG/ML
4 INJECTION INTRAMUSCULAR; INTRAVENOUS EVERY 6 HOURS PRN
Status: DISCONTINUED | OUTPATIENT
Start: 2024-01-16 | End: 2024-01-19

## 2024-01-16 RX ORDER — SPIRONOLACTONE 25 MG/1
50 TABLET ORAL DAILY
Status: DISCONTINUED | OUTPATIENT
Start: 2024-01-17 | End: 2024-01-19

## 2024-01-16 RX ORDER — NICOTINE POLACRILEX 4 MG
30 LOZENGE BUCCAL
Status: DISCONTINUED | OUTPATIENT
Start: 2024-01-16 | End: 2024-01-19

## 2024-01-16 RX ORDER — AMOXICILLIN AND CLAVULANATE POTASSIUM 875; 125 MG/1; MG/1
875 TABLET, FILM COATED ORAL EVERY 12 HOURS SCHEDULED
Status: DISCONTINUED | OUTPATIENT
Start: 2024-01-17 | End: 2024-01-19

## 2024-01-16 RX ORDER — PROCHLORPERAZINE EDISYLATE 5 MG/ML
5 INJECTION INTRAMUSCULAR; INTRAVENOUS EVERY 8 HOURS PRN
Status: DISCONTINUED | OUTPATIENT
Start: 2024-01-16 | End: 2024-01-19

## 2024-01-16 RX ORDER — LABETALOL HYDROCHLORIDE 5 MG/ML
5 INJECTION, SOLUTION INTRAVENOUS ONCE
Status: COMPLETED | OUTPATIENT
Start: 2024-01-16 | End: 2024-01-16

## 2024-01-16 RX ORDER — HEPARIN SODIUM AND DEXTROSE 10000; 5 [USP'U]/100ML; G/100ML
INJECTION INTRAVENOUS CONTINUOUS
Status: DISCONTINUED | OUTPATIENT
Start: 2024-01-17 | End: 2024-01-16

## 2024-01-16 RX ORDER — MIDAZOLAM HYDROCHLORIDE 5 MG/ML
1 INJECTION, SOLUTION INTRAMUSCULAR; INTRAVENOUS ONCE
Status: COMPLETED | OUTPATIENT
Start: 2024-01-16 | End: 2024-01-16

## 2024-01-16 RX ORDER — FLUTICASONE PROPIONATE 50 MCG
1 SPRAY, SUSPENSION (ML) NASAL DAILY
Status: DISCONTINUED | OUTPATIENT
Start: 2024-01-16 | End: 2024-01-19

## 2024-01-16 RX ORDER — ACETAMINOPHEN 500 MG
500 TABLET ORAL EVERY 4 HOURS PRN
Status: DISCONTINUED | OUTPATIENT
Start: 2024-01-16 | End: 2024-01-19

## 2024-01-16 RX ORDER — MIDAZOLAM HYDROCHLORIDE 1 MG/ML
1 INJECTION INTRAMUSCULAR; INTRAVENOUS ONCE AS NEEDED
Status: ACTIVE | OUTPATIENT
Start: 2024-01-16 | End: 2024-01-16

## 2024-01-16 RX ORDER — DEXTROSE MONOHYDRATE 25 G/50ML
50 INJECTION, SOLUTION INTRAVENOUS
Status: DISCONTINUED | OUTPATIENT
Start: 2024-01-16 | End: 2024-01-19

## 2024-01-16 RX ORDER — NICOTINE POLACRILEX 4 MG
15 LOZENGE BUCCAL
Status: DISCONTINUED | OUTPATIENT
Start: 2024-01-16 | End: 2024-01-19

## 2024-01-16 RX ORDER — ONDANSETRON 2 MG/ML
4 INJECTION INTRAMUSCULAR; INTRAVENOUS ONCE
Status: COMPLETED | OUTPATIENT
Start: 2024-01-16 | End: 2024-01-16

## 2024-01-16 RX ORDER — MORPHINE SULFATE 2 MG/ML
2 INJECTION, SOLUTION INTRAMUSCULAR; INTRAVENOUS ONCE
Status: COMPLETED | OUTPATIENT
Start: 2024-01-16 | End: 2024-01-16

## 2024-01-16 RX ORDER — ECHINACEA PURPUREA EXTRACT 125 MG
1 TABLET ORAL
Status: DISCONTINUED | OUTPATIENT
Start: 2024-01-16 | End: 2024-01-19

## 2024-01-16 RX ORDER — HEPARIN SODIUM AND DEXTROSE 10000; 5 [USP'U]/100ML; G/100ML
1000 INJECTION INTRAVENOUS ONCE
Status: COMPLETED | OUTPATIENT
Start: 2024-01-16 | End: 2024-01-16

## 2024-01-16 RX ORDER — HYDROCODONE BITARTRATE AND ACETAMINOPHEN 5; 325 MG/1; MG/1
2 TABLET ORAL EVERY 4 HOURS PRN
Status: DISCONTINUED | OUTPATIENT
Start: 2024-01-16 | End: 2024-01-19

## 2024-01-16 NOTE — ED INITIAL ASSESSMENT (HPI)
Pt to ED via EMS from immediate care for abnormal CT, CT shows \"a round hypodense mass in right temporal lobe measuring 0.8x 0.6 cm.\" Pt reports she has been experiencing pain on her R frontal head. Per EMS VS [facility]. A&Ox4.

## 2024-01-16 NOTE — H&P
[facility] H&P/Consult note       CC:   Chief Complaint   Patient presents with    Abnormal Result        PCP: Jolene Ocampo MD    History of Present Illness: Patient is a 58 year old female with PMH sig for htn, hld, dm2, here for headaches, nausea    She started developing neck pain couple months ago, thought to be msk as was worse when she touches the area. For the past week has had worsening R sided facial / neck pain, headaches, nausea. Went to urgent care, found to have sinusitis and possible brain mass, sent to ER  In ER noted to have cavernoma and venous sinus thrombosis. Started on heparin drip  She denied any hx of easy bleeding, no hx of ulcers      PMH  Past Medical History:   Diagnosis Date    Anemia     DIABETES     Diabetes mellitus type II, uncontrolled     Disorder of liver     FATTY LIVER    Diverticulosis     Esophageal reflux     Extrinsic asthma, unspecified     ALLERGY INDUCED    HYPERLIPIDEMIA     HYPERTRIGLYCERIDEMIA     Hypertriglyceridemia     ALEXY (obstructive sleep apnea) 2020 HST    AHI 30 Supine AHI 55 non-supine AHI 24 Sao2 Prince 79%    Other and unspecified hyperlipidemia     Type II or unspecified type diabetes mellitus without mention of complication, not stated as uncontrolled     Unspecified essential hypertension         PSH  Past Surgical History:   Procedure Laterality Date          COLONOSCOPY      COLONOSCOPY,DIAGNOSTIC N/A 10/10/2015    Procedure: ESOPHAGOGASTRODUODENOSCOPY, COLONOSCOPY, POSSIBLE BIOPSY, POSSIBLE POLYPECTOMY 47144, 73988;  Surgeon: Ishan Fournier MD;  Location: [facility]    LUMPECTOMY RIGHT Right 2023    OTHER SURGICAL HISTORY   [facility]    ENDOSCOPY X'S 3    UPPER GI ENDOSCOPY,DIAGNOSIS N/A 10/10/2015    Procedure: ESOPHAGOGASTRODUODENOSCOPY, COLONOSCOPY, POSSIBLE BIOPSY, POSSIBLE POLYPECTOMY 18934, 65565;  Surgeon: Ishan Fournier MD;  Location: [facility]        ALL:  Allergies    Allergen Reactions    Ibuprofen HIVES    Lisinopril ANAPHYLAXIS    Aspirin OTHER (SEE COMMENTS)     At age 14, had rash, swelling and diarrhea with Excedrin     Sulfa Antibiotics RASH        Home Medications:  Outpatient Medications Marked as Taking for the 1/16/24 encounter (Hospital Encounter)   Medication Sig Dispense Refill    amoxicillin clavulanate 875-125 MG Oral Tab Take 1 tablet by mouth 2 (two) times daily for 10 days. 20 tablet 0         Soc Hx  Social History     Tobacco Use    Smoking status: Never    Smokeless tobacco: Never   Substance Use Topics    Alcohol use: No     Alcohol/week: 0.0 standard drinks of alcohol        Fam Hx  Family History   Problem Relation Age of Onset    Cancer Father         Lung cancer    Lung Disorder Father         COPD    Hypertension Mother     High Cholesterol Mother        Review of Systems  Comprehensive ROS reviewed and negative except for what's stated above.  Including negative for chest pain, shortness of breath, syncope.       OBJECTIVE:  BP (!) 164/83   Pulse 85   Temp 98.2 °F (36.8 °C) (Oral)   Resp 18   Ht 5' 3\" (1.6 m)   Wt 224 lb (101.6 kg)   LMP 08/08/2015   SpO2 97%   BMI 39.68 kg/m²   General:  Alert, no distress, appears stated age.   Head:  Normocephalic, without obvious abnormality, atraumatic.   Eyes:  Sclera anicteric, No conjunctival pallor, EOMs intact.    Throat: dry   Neck: Supple    Lungs:   Clear to auscultation bilaterally. Normal effort   Chest wall:  No tenderness or deformity.   Heart:  Regular rate and rhythm    Abdomen:   Soft, NT/ND    Extremities: Atraumatic, no cyanosis or edema.   Skin: No visible rashes or lesions.    Neurologic: Normal strength, no focal deficit appreciated     Diagnostic Data:    CBC/Chem  Recent Labs   Lab 01/12/24 2056 01/16/24  1247   WBC 7.7 8.5   HGB 14.4 13.2   MCV 84.7 82.7   .0 307.0   INR  --  1.03       Recent Labs   Lab 01/12/24 2056 01/16/24  1247   * 138   K 4.4 4.4    106    CO2 23.0 22.0   BUN 26* 19   CREATSERUM 1.14* 1.01   * 239*   CA 9.6 9.2       Recent Labs   Lab 01/16/24  1247   ALT 24   AST 15   ALB 3.3*       No results for input(s): \"TROP\" in the last 168 hours.         Radiology: MRI BRAIN (W+WO) (CPT=70553)    Result Date: 1/16/2024  PROCEDURE:  MRI BRAIN (W+WO) (CPT=70553)  COMPARISON:  None.  INDICATIONS:  R temportal mass  TECHNIQUE:  MRI of the brain was performed with multi-planar T1, T2-weighted images with FLAIR sequences and diffusion weighted images without and with infusion.  PATIENT STATED HISTORY:(As transcribed by Technologist)  Right sided neck,ear, and headache.   CONTRAST USED:  30 mL of Dotarem  FINDINGS:   Region of blooming artifact with mild FLAIR abnormality along with T2 abnormality in the right temporal lobe is noted.  Low signal on T1 is also noted.  Minimal enhancement extending towards this region is noted.  This is suspicious for a cavernoma.  This measures approximately 1.2 x 0.9 cm.  Associated susceptibility is noted.  Direct correlation with prior CT examination be helpful.  There is thrombosis of the right transverse sinus, sigmoid sinus along with the very proximal right internal jugular vein.  The left transverse sinus, sigmoid sinus along with straight sinus and superior sagittal sinus are patent.  Internal cerebral veins are patent.  There is no evidence of an acute infarct.  Mild mucoperiosteal thickening of the paranasal sinuses.  Flow voids the [id number] are patent.  Mastoid air cells are overall unremarkable.               CONCLUSION:   1. There is suggestion of a cavernoma in the right temporal lobe that measures approximately 1.2 x 0.9 cm.  Direct correlation with prior CT examination be helpful.  2. There is thrombosis of the right transverse and sigmoid sinuses.  There is extension of this thrombus into the proximal right internal jugular vein.  3. No evidence of an acute infarct.  This critical result was  discussed with Dr. Dorado at 1512 hours on 1/16/2024. Read back was performed.    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 [facility] hospitalist to continue to follow patient while in house    Patient and/or patient's family given opportunity to ask questions and note understanding and agreeing with therapeutic plan as outlined    Yimi Dumont Hospitalist  [phone]  Answering Service: [phone]

## 2024-01-16 NOTE — CONSULTS
[facility]  Neurological Surgery Consultation Note    Karen Mtz Patient Status:  Emergency    1965 MRN [id number]   [facility] EMERGENCY DEPARTMENT Attending Rambo Dorado MD   Hosp Day # 0 PCP Jolene Ocampo MD     REASON FOR CONSULTATION:  Right transverse sigmoid thrombosis  Right temporal cavernoma    HISTORY OF PRESENT ILLNESS:  Karen Mtz is a 58 year old female who presented to the ED given incidental findings of a right temporal cavernoma seen on a CT sinus.  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  She reports sinus issues since 2023 at which time she also had right posterior neck tenderness.  She reports that she is having her baseline headaches she experiences, often related to caffeine withdrawal.  She denies any seizure activity, thunderclap headaches, or right sided pulsatile tinnitus.      PAST MEDICAL HISTORY:  Past Medical History:   Diagnosis Date    Anemia     DIABETES 2005    Diabetes mellitus type II, uncontrolled     Disorder of liver     FATTY LIVER    Diverticulosis     Esophageal reflux     Extrinsic asthma, unspecified     ALLERGY INDUCED    HYPERLIPIDEMIA     HYPERTRIGLYCERIDEMIA     Hypertriglyceridemia     ALEXY (obstructive sleep apnea) 2020 HST    AHI 30 Supine AHI 55 non-supine AHI 24 Sao2 Prince 79%    Other and unspecified hyperlipidemia     Type II or unspecified type diabetes mellitus without mention of complication, not stated as uncontrolled     Unspecified essential hypertension        PAST SURGICAL HISTORY:  Past Surgical History:   Procedure Laterality Date          COLONOSCOPY      COLONOSCOPY,DIAGNOSTIC N/A 10/10/2015    Procedure: ESOPHAGOGASTRODUODENOSCOPY, COLONOSCOPY, POSSIBLE BIOPSY, POSSIBLE POLYPECTOMY 36404, 66302;  Surgeon: Ishan Fournier MD;  Location: [facility]  [place], [facility]    LUMPECTOMY RIGHT Right 03/16/2023    OTHER SURGICAL HISTORY  9/08 [facility]    ENDOSCOPY X'S 3    UPPER GI ENDOSCOPY,DIAGNOSIS N/A 10/10/2015    Procedure: ESOPHAGOGASTRODUODENOSCOPY, COLONOSCOPY, POSSIBLE BIOPSY, POSSIBLE POLYPECTOMY 92967, 76792;  Surgeon: Ishan Fournier MD;  [facility], [facility]       FAMILY HISTORY:  family history includes Cancer in her father; High Cholesterol in her mother; Hypertension in her mother; Lung Disorder in her father.    SOCIAL HISTORY:   reports that she has never smoked. She has never used smokeless tobacco. She reports that she does not drink alcohol and does not use drugs.    ALLERGIES:  Allergies   Allergen Reactions    Ibuprofen HIVES    Lisinopril ANAPHYLAXIS    Aspirin OTHER (SEE COMMENTS)     At age 14, had rash, swelling and diarrhea with Excedrin     Sulfa Antibiotics RASH       MEDICATIONS:  (Not in a hospital admission)    Current Facility-Administered Medications   Medication Dose Route Frequency    ampicillin-sulbactam (Unasyn) 3 g in sodium chloride 0.9% 100mL IVPB-ADD  3 g Intravenous Once    heparin (Porcine) 03071 units/250mL infusion INITIAL DOSE  1,000 Units/hr Intravenous Once    labetalol (Trandate) 5 mg/mL injection 5 mg  5 mg Intravenous Once       REVIEW OF SYSTEMS:  A 10-point system was reviewed.  Pertinent positives and negatives are noted in HPI.      PHYSICAL EXAMINATION:  VITAL SIGNS: BP (!) 164/83   Pulse 85   Temp 98.2 °F (36.8 °C) (Oral)   Resp 18   Ht 63\"   Wt 224 lb (101.6 kg)   LMP 08/08/2015   SpO2 97%   BMI 39.68 kg/m²     NEUROLOGICAL:    A&Ox 3, Speech fluent. Comprehension intact  PERRL, EOMI, TM   Slight LEFT proptosis, otherwise face symmetric  Full strength x 4, no drift  Sensation intact    DIAGNOSTIC DATA:   Lab Results   Component Value Date    WBC 8.5 01/16/2024    HGB 13.2 01/16/2024    HCT 39.1 01/16/2024    .0 01/16/2024    CREATSERUM 1.01 01/16/2024    BUN 19 01/16/2024      01/16/2024    K 4.4 01/16/2024     01/16/2024    CO2 22.0 01/16/2024     01/16/2024    CA 9.2 01/16/2024    ALB 3.3 01/16/2024    ALKPHO 79 01/16/2024    BILT 0.2 01/16/2024    TP 7.4 01/16/2024    AST 15 01/16/2024    ALT 24 01/16/2024    PTT 23.4 01/16/2024    INR 1.03 01/16/2024    PTP 13.6 01/16/2024       ASSESSMENT:  59yo female with a right temporal cavernoma and thrombosis of the right transverse sigmoid sinuses without venous infarct    Spoke with the patient about the imaging findings and plan of care. She expressed understanding and agreement with the plan.    Plan:  - Agreed with neurology's plan for anticoagulation for venous sinus thrombosis  -  obtain MRA w/wo tomorrow to establish a baseline for follow up for venous sinus thrombosis  - No acute neurosurgical intervention for caveroma  - Given cavernoma, will monitor in the neuro ICU until therapeutic on anticoagulation and will obtain a head CT prior to switching to oral anticoagulant     Case discussed with NCC, ED, and neuroradiology    Rocael Covarrubias MD  Neurological Surgery  [facility]    Critical care time: 30 minutes

## 2024-01-16 NOTE — ED PROVIDER NOTES
Patient Seen in: [facility] Emergency Department      History     Chief Complaint   Patient presents with    Abnormal Result     Stated Complaint: abnormal ct    Subjective:   HPI    58-year-old woman history of diabetes here for evaluation of abnormal CT at immediate care.  Patient presents to the immediate care reporting over 1 week of sinus congestion developed right-sided facial pain.  Went to her had a CT of the head CT sinuses that showed right-sided maxillary sinusitis but also demonstrated small mass in the right temporal lobe approximately 6 x 9 mm.  Patient states she has some discomfort along the posterior cervical chain of lymph nodes, denies any focal weakness or numbness any vision changes any headaches, any other concerning symptoms at this time.    Objective:   Past Medical History:   Diagnosis Date    Anemia     DIABETES     Diabetes mellitus type II, uncontrolled     Disorder of liver     FATTY LIVER    Diverticulosis     Esophageal reflux     Extrinsic asthma, unspecified     ALLERGY INDUCED    HYPERLIPIDEMIA     HYPERTRIGLYCERIDEMIA     Hypertriglyceridemia     ALEXY (obstructive sleep apnea) 2020 HST    AHI 30 Supine AHI 55 non-supine AHI 24 Sao2 Prince 79%    Other and unspecified hyperlipidemia     Type II or unspecified type diabetes mellitus without mention of complication, not stated as uncontrolled     Unspecified essential hypertension               Past Surgical History:   Procedure Laterality Date          COLONOSCOPY      COLONOSCOPY,DIAGNOSTIC N/A 10/10/2015    Procedure: ESOPHAGOGASTRODUODENOSCOPY, COLONOSCOPY, POSSIBLE BIOPSY, POSSIBLE POLYPECTOMY 81656, 74917;  Surgeon: Ishan Fournier MD;  [facility] [facility]    LUMPECTOMY RIGHT Right 2023    OTHER SURGICAL HISTORY   [facility]    ENDOSCOPY X'S 3    UPPER GI ENDOSCOPY,DIAGNOSIS N/A 10/10/2015    Procedure: ESOPHAGOGASTRODUODENOSCOPY, COLONOSCOPY, POSSIBLE BIOPSY, POSSIBLE  POLYPECTOMY 60453, 43387;  Surgeon: Ishan Fournier MD;  Location: [facility], [facility]                Social History     Socioeconomic History    Marital status:      Spouse name:     Number of children: 2   Occupational History    Occupation:  worker   Tobacco Use    Smoking status: Never    Smokeless tobacco: Never   Vaping Use    Vaping Use: Never used   Substance and Sexual Activity    Alcohol use: No     Alcohol/week: 0.0 standard drinks of alcohol    Drug use: No    Sexual activity: Yes     Partners: Male              Review of Systems    Positive for stated complaint: abnormal ct  Other systems are as noted in HPI.  Constitutional and vital signs reviewed.      All other systems reviewed and negative except as noted above.    Physical Exam     ED Triage Vitals   BP 01/16/24 1208 (!) 161/94   Pulse 01/16/24 1208 80   Resp 01/16/24 1208 16   Temp 01/16/24 1208 98.2 °F (36.8 °C)   Temp src 01/16/24 1208 Oral   SpO2 01/16/24 1208 99 %   O2 Device 01/16/24 1201 None (Room air)       Current:/81   Pulse 81   Temp 98.2 °F (36.8 °C) (Oral)   Resp 20   Ht 160 cm (5' 3\")   Wt 101.6 kg   LMP 08/08/2015   SpO2 97%   BMI 39.68 kg/m²         Physical Exam        Physical Exam  Vitals signs and nursing note reviewed.   General:   Well-appearing woman sitting in the bed in no acute distress  Head: Normocephalic and atraumatic.   HEENT:  Mucous membranes are moist.  TMs with mild erythema bilaterally.  Boggy nasal mucosa bilaterally.  Oropharynx is clear.  Cardiovascular:  Normal rate and regular rhythm.  No Edema  Pulmonary:  Pulmonary effort is normal.  Normal breath sounds. No wheezing, rhonchi or rales.   Abdominal: Soft nontender nondistended, normal bowel sounds, no guarding no rebound tenderness  Skin: Warm and dry  Neurological: Awake alert, speech is normal, motor 5/5 in bilateral upper and lower extremities.  sensation is grossly intact throughout.  No facial asymmetry.   Stable narrow based gait.        ED Course     Labs Reviewed   COMP METABOLIC PANEL (14) - Abnormal; Notable for the following components:       Result Value    Glucose 239 (*)     Calculated Osmolality 296 (*)     Albumin 3.3 (*)     A/G Ratio 0.8 (*)     All other components within normal limits   PROTHROMBIN TIME (PT) - Normal   CBC WITH DIFFERENTIAL WITH PLATELET    Narrative:     The following orders were created for panel order CBC With Differential With Platelet.  Procedure                               Abnormality         Status                     ---------                               -----------         ------                     CBC W/ DIFFERENTIAL[[id number]]                              Final result                 Please view results for these tests on the individual orders.   CBC W/ DIFFERENTIAL                                                      Medical Decision Making  58-year-old woman here for abnormal finding on CT.  Presented for right-sided facial discomfort nasal congestion differential includes viral versus bacterial sinusitis.  Given the duration of her symptoms we will treat with a 10-day course of Augmentin for sinusitis.  She is already using Flonase.  The small intracranial mass seen on the CT there was discussed with Dr. Byers from neurosurgery.  He states patient can follow-up in neurosurgery clinic next week he requested an MRI with and without for further evaluation.  States steroids not necessary at this time as patient does not appear to be symptomatic.  She has no neurologic complaints.  Discussed this plan with the patient she is in agreement.        Disposition and Plan     Clinical Impression:  1. Intracranial mass    2. Acute maxillary sinusitis, recurrence not specified         Disposition:  Discharge  1/16/2024  2:39 pm    Follow-up:  Jolene Ocampo MD  [address]  [address]  [facility] [address] [phone]    Follow up  Follow-up with your PMD for reevaluation in  24 to 48 hours.  Return to ER if symptoms worsen or change or if any other new concerns.    Robbie Byers MD  [address]  [address] [address]  [phone]    Schedule an appointment as soon as possible for a visit in 1 day(s)  Please call the clinic tomorrow to establish an appointment for close follow-up next week for further evaluation of intracranial mass seen on MRI here today.          Medications Prescribed:  Current Discharge Medication List        START taking these medications    Details   amoxicillin clavulanate 875-125 MG Oral Tab Take 1 tablet by mouth 2 (two) times daily for 10 days.  Qty: 20 tablet, Refills: 0

## 2024-01-17 ENCOUNTER — APPOINTMENT (OUTPATIENT)
Dept: MRI IMAGING | Facility: HOSPITAL | Age: 59
End: 2024-01-17
Attending: NURSE PRACTITIONER
Payer: COMMERCIAL

## 2024-01-17 ENCOUNTER — APPOINTMENT (OUTPATIENT)
Dept: CT IMAGING | Facility: HOSPITAL | Age: 59
End: 2024-01-17
Attending: Other
Payer: COMMERCIAL

## 2024-01-17 PROBLEM — Q28.3 CEREBRAL CAVERNOMA: Status: ACTIVE | Noted: 2024-01-17

## 2024-01-17 PROBLEM — E11.9 TYPE 2 DIABETES MELLITUS WITHOUT COMPLICATION, WITH LONG-TERM CURRENT USE OF INSULIN (HCC): Status: ACTIVE | Noted: 2018-07-26

## 2024-01-17 PROBLEM — Z79.4 TYPE 2 DIABETES MELLITUS WITHOUT COMPLICATION, WITH LONG-TERM CURRENT USE OF INSULIN (HCC): Status: ACTIVE | Noted: 2018-07-26

## 2024-01-17 PROBLEM — G08 ACUTE CEREBRAL VENOUS SINUS THROMBOSIS (HCC): Status: ACTIVE | Noted: 2024-01-16

## 2024-01-17 PROBLEM — J01.90 ACUTE NON-RECURRENT SINUSITIS: Status: ACTIVE | Noted: 2024-01-16

## 2024-01-17 PROBLEM — G08 ACUTE CEREBRAL VENOUS SINUS THROMBOSIS: Status: ACTIVE | Noted: 2024-01-16

## 2024-01-17 PROBLEM — Q28.3 CEREBRAL CAVERNOMA (HCC): Status: ACTIVE | Noted: 2024-01-17

## 2024-01-17 PROBLEM — R51.9 ACUTE NONINTRACTABLE HEADACHE: Status: ACTIVE | Noted: 2024-01-17

## 2024-01-17 LAB
ANION GAP SERPL CALC-SCNC: 4 MMOL/L (ref 0–18)
BUN BLD-MCNC: 20 MG/DL (ref 9–23)
CALCIUM BLD-MCNC: 8.7 MG/DL (ref 8.5–10.1)
CHLORIDE SERPL-SCNC: 107 MMOL/L (ref 98–112)
CHOLEST SERPL-MCNC: 276 MG/DL (ref ?–200)
CO2 SERPL-SCNC: 26 MMOL/L (ref 21–32)
CREAT BLD-MCNC: 0.89 MG/DL
EGFRCR SERPLBLD CKD-EPI 2021: 75 ML/MIN/1.73M2 (ref 60–?)
ERYTHROCYTE [DISTWIDTH] IN BLOOD BY AUTOMATED COUNT: 13.8 %
EST. AVERAGE GLUCOSE BLD GHB EST-MCNC: 295 MG/DL (ref 68–126)
GLUCOSE BLD-MCNC: 109 MG/DL (ref 70–99)
GLUCOSE BLD-MCNC: 139 MG/DL (ref 70–99)
GLUCOSE BLD-MCNC: 150 MG/DL (ref 70–99)
GLUCOSE BLD-MCNC: 169 MG/DL (ref 70–99)
GLUCOSE BLD-MCNC: 195 MG/DL (ref 70–99)
HBA1C MFR BLD: 11.9 % (ref ?–5.7)
HCT VFR BLD AUTO: 36.9 %
HDLC SERPL-MCNC: 44 MG/DL (ref 40–59)
HGB BLD-MCNC: 12.1 G/DL
LDLC SERPL DIRECT ASSAY-MCNC: 150 MG/DL (ref ?–100)
MAGNESIUM SERPL-MCNC: 1.8 MG/DL (ref 1.6–2.6)
MCH RBC QN AUTO: 27.9 PG (ref 26–34)
MCHC RBC AUTO-ENTMCNC: 32.8 G/DL (ref 31–37)
MCV RBC AUTO: 85.2 FL
MRSA DNA SPEC QL NAA+PROBE: NEGATIVE
NONHDLC SERPL-MCNC: 232 MG/DL (ref ?–130)
OSMOLALITY SERPL CALC.SUM OF ELEC: 292 MOSM/KG (ref 275–295)
PLATELET # BLD AUTO: 245 10(3)UL (ref 150–450)
POTASSIUM SERPL-SCNC: 4 MMOL/L (ref 3.5–5.1)
RBC # BLD AUTO: 4.33 X10(6)UL
SODIUM SERPL-SCNC: 137 MMOL/L (ref 136–145)
TRIGL SERPL-MCNC: 892 MG/DL (ref 30–149)
WBC # BLD AUTO: 6.8 X10(3) UL (ref 4–11)

## 2024-01-17 PROCEDURE — 70546 MR ANGIOGRAPH HEAD W/O&W/DYE: CPT | Performed by: NURSE PRACTITIONER

## 2024-01-17 PROCEDURE — 99291 CRITICAL CARE FIRST HOUR: CPT | Performed by: NEUROLOGICAL SURGERY

## 2024-01-17 PROCEDURE — 70450 CT HEAD/BRAIN W/O DYE: CPT | Performed by: OTHER

## 2024-01-17 PROCEDURE — 99291 CRITICAL CARE FIRST HOUR: CPT | Performed by: OTHER

## 2024-01-17 RX ORDER — GADOTERATE MEGLUMINE 376.9 MG/ML
20 INJECTION INTRAVENOUS
Status: COMPLETED | OUTPATIENT
Start: 2024-01-17 | End: 2024-01-17

## 2024-01-17 RX ORDER — MIDAZOLAM HYDROCHLORIDE 1 MG/ML
1 INJECTION INTRAMUSCULAR; INTRAVENOUS ONCE
Status: COMPLETED | OUTPATIENT
Start: 2024-01-17 | End: 2024-01-17

## 2024-01-17 RX ORDER — MAGNESIUM OXIDE 400 MG/1
400 TABLET ORAL ONCE
Status: COMPLETED | OUTPATIENT
Start: 2024-01-17 | End: 2024-01-17

## 2024-01-17 RX ORDER — ALBUTEROL SULFATE 90 UG/1
2 AEROSOL, METERED RESPIRATORY (INHALATION) EVERY 6 HOURS PRN
Status: DISCONTINUED | OUTPATIENT
Start: 2024-01-17 | End: 2024-01-19

## 2024-01-17 NOTE — CONSULTS
[facility]   NEUROCRITICAL CARE   CONSULT NOTE    Admission date: 2024  Reason for Consult: VST  Chief Complaint:   Chief Complaint   Patient presents with    Abnormal Result   ________________________________________________________________    History     History of Presenting Illness  58 year old female with PMH of HTN, HLD, DM and asthma who presents with sinus pressure and headache since November, imaging demonstrating right temporal mass so came to the ED for further evaluation.  MRI demonstrated 1 cm right temporal cavernoma but also noted right transverse sigmoid venous sinus thrombosis.  Patient started on heparin drip, no signs of bleeding, no neurologic deficits will transitioned therapeutic Lovenox.  No recent head or neck trauma, denies recent COVID infection or getting COVID-vaccine this past year, no changes in medications.  No personal or family history of clotting disorders.      Past Medical History:   Diagnosis Date    Anemia     Asthma     DIABETES 2005    Diabetes mellitus type II, uncontrolled     Disorder of liver     FATTY LIVER    Diverticulosis     Esophageal reflux     Extrinsic asthma, unspecified     ALLERGY INDUCED    High blood pressure     High cholesterol     HYPERLIPIDEMIA     HYPERTRIGLYCERIDEMIA     Hypertriglyceridemia     Neuropathy     ALEXY (obstructive sleep apnea) 2020 HST    AHI 30 Supine AHI 55 non-supine AHI 24 Sao2 [place] 79%    Other and unspecified hyperlipidemia     Sleep apnea     Type II or unspecified type diabetes mellitus without mention of complication, not stated as uncontrolled     Unspecified essential hypertension      Past Surgical History:   Procedure Laterality Date          COLONOSCOPY      COLONOSCOPY,DIAGNOSTIC N/A 10/10/2015    Procedure: ESOPHAGOGASTRODUODENOSCOPY, COLONOSCOPY, POSSIBLE BIOPSY, POSSIBLE POLYPECTOMY 55820, 73202;  Surgeon: Ishan Fournier MD;  [place] [facility] [place] [facility] [facility]    LUMPECTOMY RIGHT  Right 03/16/2023    OTHER SURGICAL HISTORY  9/08 [facility]    ENDOSCOPY X'S 3    UPPER GI ENDOSCOPY,DIAGNOSIS N/A 10/10/2015    Procedure: ESOPHAGOGASTRODUODENOSCOPY, COLONOSCOPY, POSSIBLE BIOPSY, POSSIBLE POLYPECTOMY 02760, 12715;  Surgeon: Ishan Fournier MD;  [place] [facility] [facility]     Social History     Socioeconomic History    Marital status:      Spouse name:     Number of children: 2   Occupational History    Occupation:  worker   Tobacco Use    Smoking status: Never    Smokeless tobacco: Never   Vaping Use    Vaping Use: Never used   Substance and Sexual Activity    Alcohol use: No     Alcohol/week: 0.0 standard drinks of alcohol    Drug use: No    Sexual activity: Yes     Partners: Male     Social Determinants of Health     Food Insecurity: No Food Insecurity (1/16/2024)    Food Insecurity     Food Insecurity: Never true   Transportation Needs: No Transportation Needs (1/16/2024)    Transportation Needs     Lack of Transportation: No   Housing Stability: Low Risk  (1/16/2024)    Housing Stability     Housing Instability: No     Family History   Problem Relation Age of Onset    Cancer Father         Lung cancer    Lung Disorder Father         COPD    Hypertension Mother     High Cholesterol Mother      Allergies   Allergies   Allergen Reactions    Ibuprofen HIVES    Lisinopril ANAPHYLAXIS    Sulfa Antibiotics HIVES    Aspirin OTHER (SEE COMMENTS)     At age 14, had rash, swelling and diarrhea with Excedrin        Home Meds  Current Outpatient Medications   Medication Instructions    albuterol (PROAIR HFA) 108 (90 Base) MCG/ACT Inhalation Aero Soln 2 puffs, Inhalation, Every 6 hours PRN    amoxicillin clavulanate 875-125 MG Oral Tab 1 tablet, Oral, 2 times daily    cetirizine (ZYRTEC) 10 mg, Oral, Daily    clotrimazole-betamethasone 1-0.05 % External Cream APPLY TO AFFECTED AREA DAILY    Continuous Blood Gluc  (FREESTYLE ROSENDA 14 DAY READER) Does not  apply Device 1 Units, Does not apply, As needed    Continuous Blood Gluc Sensor (FREESTYLE ROSENDA 14 DAY SENSOR) Does not apply Misc 1 each, Does not apply, Every 14 days    ergocalciferol (VITAMIN D2) 50,000 Units, Oral, Weekly    estradiol (ESTRACE) 0.1 MG/GM Vaginal Cream Use 1 g nightly x 2 wks, then use 1 g twice a week    ezetimibe (ZETIA) 10 mg, Oral, Daily    famotidine (PEPCID) 20 mg, Oral, 2 times daily    FENOFIBRATE 145 MG Oral Tab TAKE ONE TABLET BY MOUTH ONCE DAILY     fexofenadine (ALLEGRA) 180 mg, Oral, Daily    fluticasone propionate 50 MCG/ACT Inhalation Aerosol Powder, Breath Activated 1 puff, Inhalation, 2 times daily    furosemide (LASIX) 20 mg, Oral, Daily    glimepiride 4 MG Oral Tab No dose, route, or frequency recorded.    Glucose Blood (EVANGELINA CONTOUR NEXT TEST) In Vitro Strip Test 4 times daily    Icosapent Ethyl (VASCEPA OR) Take by mouth.    Insulin Aspart Pen (NOVOLOG FLEXPEN) 100 UNIT/ML Subcutaneous Solution Pen-injector Take 10 units with dinner    Insulin Pen Needle (NOVOFINE PLUS) 32G X 4 MM Does not apply Misc Use 4 per day    Levocetirizine Dihydrochloride (XYZAL OR) Take by mouth.    Magnesium 500 MG Oral Tab Oral    metoprolol succinate ER (TOPROL XL) 25 mg, Oral, Daily    Nystatin Does not apply Powder Does not apply    OMEPRAZOLE 40 MG Oral Capsule Delayed Release TAKE ONE CAPSULE BY MOUTH ONCE DAILY    ondansetron (ZOFRAN-ODT) 4 mg, Oral, Every 4 hours PRN    rosuvastatin (CRESTOR) 10 mg, Oral, Nightly    spironolactone (ALDACTONE) 50 mg, Oral, Daily    topiramate (TOPAMAX) 100 mg, Oral, 2 times daily    Toujeo Max SoloStar 100 Units, Subcutaneous, Daily     Scheduled Meds:   fluticasone propionate  1 spray Each Nare Daily    amoxicillin potassium and clavulanate  875 mg Oral Q12H    spironolactone  50 mg Oral Daily    rosuvastatin  10 mg Oral Nightly    pantoprazole  40 mg Oral QAM AC    metoprolol succinate ER  25 mg Oral Daily Beta Blocker    insulin detemir  80 Units  Subcutaneous Nightly    insulin aspart  10 Units Subcutaneous TID CC    ezetimibe  10 mg Oral Nightly    insulin aspart  2-10 Units Subcutaneous TID AC and HS    enoxaparin  1 mg/kg Subcutaneous 2 times per day     Continuous Infusions:  PRN Meds:sodium chloride, acetaminophen, ondansetron, prochlorperazine, glucose **OR** glucose **OR** glucose-vitamin C **OR** dextrose **OR** glucose **OR** glucose **OR** glucose-vitamin C, HYDROcodone-acetaminophen **OR** HYDROcodone-acetaminophen, hydrALAzine    OBJECTIVE   VITAL SIGNS:   Temp:  [97.3 °F (36.3 °C)-98.2 °F (36.8 °C)] 97.8 °F (36.6 °C)  Pulse:  [68-86] 80  Resp:  [12-24] 24  BP: (109-178)/() 135/63  SpO2:  [93 %-100 %] 99 %    INTAKE/OUTPUT:  Intake/Output Summary (Last 24 hours) at 1/17/2024 0907  Last data filed at 1/17/2024 0400  Gross per 24 hour   Intake 280 ml   Output --   Net 280 ml     PHYSICAL EXAM:  GENERAL APPEARANCE: Patient resting comfortably in bed, NAD  HEENT: Normocephalic, atraumatic.   LUNGS: Normal respiratory rate and effort   HEART: Regular rate and rhythm   ABDOMEN: Soft. No tenderness, guarding, or rebound.     NEUROLOGIC:    Mental Status:  A&O x 4, Follows simple commands, no obvious aphasia or dysarthria  Cranial nerves: PERRL.  Visual fields full.  EOMI.  Face symmetric with normal movement bilaterally.  Hearing grossly intact. Tongue midline with normal movements.   Motor: Drift:  Absent; Motor exam is 5 out of 5 in all extremities bilaterally  Sensation: Intact to light touch bilaterally  Cerebellar: Normal Finger-To-Nose test    LABORATORY DATA:  Last 24 hour labs were reviewed in detail.  Recent Labs   Lab 01/12/24 2056 01/16/24  1247 01/17/24  0424   * 138 137   K 4.4 4.4 4.0    106 107   CO2 23.0 22.0 26.0   * 239* 195*   BUN 26* 19 20   CREATSERUM 1.14* 1.01 0.89     Recent Labs   Lab 01/12/24 2056 01/16/24  1247 01/17/24  0424   WBC 7.7 8.5 6.8   HGB 14.4 13.2 12.1   .0 307.0 245.0     Recent  Labs   Lab 01/16/24  1247   ALT 24   AST 15     Recent Labs   Lab 01/17/24  0424   MG 1.8       Radiology:    MRI BRAIN (W+WO) (CPT=70553)    Result Date: 1/16/2024  CONCLUSION:   1. There is suggestion of a cavernoma in the right temporal lobe that measures approximately 1.2 x 0.9 cm.  Direct correlation with prior CT examination be helpful.  2. There is thrombosis of the right transverse and sigmoid sinuses.  There is extension of this thrombus into the proximal right internal jugular vein.  3. No evidence of an acute infarct.  This critical result was discussed with Dr. Dorado at 1512 hours on 1/16/2024. Read back was performed.    LOCATION:  [facility]    Dictated by (CST): Kwesi Viveros MD on 1/16/2024 at 3:05 PM     Finalized by (CST): Kwesi Viveros MD on 1/16/2024 at 3:12 PM      ASSESSMENT/PLAN   58 year old female with PMH of HTN, HLD, DM and asthma who presents R transverse sigmoid venous sinus thrombosis    Neurological:  Right transverse/sigmoid venous sinus thrombosis   - CT without bleed, and no neurologic deficit at this time   - Continue therapeutic Lovenox   - Repeat CT after second dose to eval for stability    - No obvious etiology noted per history     - MRA w/ and w/o to eval for fistula per nsg    - PT/OT evals    Right temporal cavernoma   - Incidentally noted on outpatient imaging, confirmed on MRI.   - Nsg aware    Cardiovascular:  HTN/HLD   - SBP goal as noted above    - Continue home medications   - IV labetalol/hydralazine pushes prn      Pulmonary:  Asthma/ALEXY  - Satting well on RA, nebs prn, CPAP at night    Renal: - Monitor I&Os      Gastrointestinal:  Nutrition:        - Diabetic diet         - Bowel regimen: ordered prn    Infectious Disease:   Sinusitis - Noted on MRI, on abx per IM, afebrile, continue to monitor     Heme/Onc - Hb goal > 7, continue to monitor daily     Endocrine:  DM Type II, uncontrolled    - Hold PO Meds in the hospital   - HbA1c 11.9   - Accuchecks q6 with  SSI prn     Checklist   - Lines: PIVs   - DVT Prophylaxis: SCDs and Lovenox therapeutic    - Diet: ADAT   - IVF: -   - Electrolytes: Replete per protocol   - Code Status: FULL     Dispo: CNICU pending stability scan    Greater than 45 minutes of critical care time (exclusive of billable procedures) was administered to manage and/or prevent neurologic instability. This involved direct patient intervention, complex decision making, and/or extensive discussions with the patient, family, and clinical staff.    Nan Estrada MD  Neurocritical Care  [facility]    Disclaimer: This record was dictated using Dragon software. There may be errors due to voice recognition problems that were not realized and corrected during the completion of the note.

## 2024-01-17 NOTE — PROGRESS NOTES
[facility]  Neurocritical Care APRN Progress Note    NAME: Karen Mtz - ROOM: 6620/6620-A - MRN: [id number] - Age: 58 year old - :1965    History Of Present Illness:  Karen Mtz is a 58 year old female with PMHx significant for DM2, HTN, HL, ALEXY and asthma who presented to immediate care with sinus pressure and right occipital headache since November.  CT showed a right temporal lobe mass  6 x 9 mm. She was directed to the ED.  MRI brain demonstrated a 1 cm right temporal cavernoma and right transverse sigmoid thrombosis.  She was started on a heparin gtt in ED but transitioned to full dose Lovenox upon arrival in CNICU.  She is neurologically intact without deficits.  She c/o pressure above both eyes and tenderness with firm pressure behind right ear and down the neck.  She denies dizziness, vision changes, balance issues, recent injury, and unilateral weakness at home.  She does not smoke.  She is mildly noncompliant with medications (skips days) and does not wear her CPAP.    Past Medical History:  Past Medical History:   Diagnosis Date    Anemia     Asthma     DIABETES 2005    Diabetes mellitus type II, uncontrolled     Disorder of liver     FATTY LIVER    Diverticulosis     Esophageal reflux     Extrinsic asthma, unspecified     ALLERGY INDUCED    High blood pressure     High cholesterol     HYPERLIPIDEMIA     HYPERTRIGLYCERIDEMIA     Hypertriglyceridemia     Neuropathy     ALEXY (obstructive sleep apnea) 2020 HST    AHI 30 Supine AHI 55 non-supine AHI 24 Sao2 [place] 79%    Other and unspecified hyperlipidemia     Sleep apnea     Type II or unspecified type diabetes mellitus without mention of complication, not stated as uncontrolled     Unspecified essential hypertension      Past Surgical History:   Past Surgical History:   Procedure Laterality Date          COLONOSCOPY      COLONOSCOPY,DIAGNOSTIC N/A 10/10/2015    Procedure: ESOPHAGOGASTRODUODENOSCOPY,  COLONOSCOPY, POSSIBLE BIOPSY, POSSIBLE POLYPECTOMY 45683, 91058;  Surgeon: Ishan Fournier MD;  Location: [facility]    LUMPECTOMY RIGHT Right 03/16/2023    OTHER SURGICAL HISTORY  9/08 [facility]    ENDOSCOPY X'S 3    UPPER GI ENDOSCOPY,DIAGNOSIS N/A 10/10/2015    Procedure: ESOPHAGOGASTRODUODENOSCOPY, COLONOSCOPY, POSSIBLE BIOPSY, POSSIBLE POLYPECTOMY 33426, 24935;  Surgeon: Ishan Fournier MD;  [place] [facility] [place] [facility] [place], [facility]      Family History:   Family History   Problem Relation Age of Onset    Cancer Father         Lung cancer    Lung Disorder Father         COPD    Hypertension Mother     High Cholesterol Mother      Social History:    reports that she has never smoked. She has never used smokeless tobacco. She reports that she does not drink alcohol and does not use drugs.     Review of Systems:   A comprehensive 10 point review of systems was completed.  Pertinent positives and negatives noted in the HPI.    Current Facility-Administered Medications   Medication Dose Route Frequency    [START ON 1/17/2024] heparin (Porcine) 03655 units/250mL infusion CONTINUOUS  200-3,000 Units/hr Intravenous Continuous    fluticasone propionate (Flonase) 50 MCG/ACT nasal suspension 1 spray  1 spray Each Nare Daily    sodium chloride (Saline Mist) 0.65 % nasal solution 1 spray  1 spray Each Nare Q3H PRN    [START ON 1/17/2024] amoxicillin clavulanate (Augmentin) 875-125 MG per tab 875 mg  875 mg Oral Q12H    [START ON 1/17/2024] spironolactone (Aldactone) tab 50 mg  50 mg Oral Daily    rosuvastatin (Crestor) tab 10 mg  10 mg Oral Nightly    [START ON 1/17/2024] pantoprazole (Protonix) DR tab 40 mg  40 mg Oral QAM AC    [START ON 1/17/2024] metoprolol succinate ER (Toprol XL) 24 hr tab 25 mg  25 mg Oral Daily Beta Blocker    insulin detemir (Levemir) 100 UNIT/ML FlexPen/FlexTouch 80 Units  80 Units Subcutaneous Nightly    [START ON 1/17/2024] insulin aspart (NovoLOG) 100 Units/mL FlexPen 10  Units  10 Units Subcutaneous TID CC    ezetimibe (Zetia) tab 10 mg  10 mg Oral Nightly    acetaminophen (Tylenol Extra Strength) tab 500 mg  500 mg Oral Q4H PRN    ondansetron (Zofran) 4 MG/2ML injection 4 mg  4 mg Intravenous Q6H PRN    prochlorperazine (Compazine) 10 MG/2ML injection 5 mg  5 mg Intravenous Q8H PRN    glucose (Dex4) 15 GM/59ML oral liquid 15 g  15 g Oral Q15 Min PRN    Or    glucose (Glutose) 40% oral gel 15 g  15 g Oral Q15 Min PRN    Or    glucose-vitamin C (Dex-4) chewable tab 4 tablet  4 tablet Oral Q15 Min PRN    Or    dextrose 50% injection 50 mL  50 mL Intravenous Q15 Min PRN    Or    glucose (Dex4) 15 GM/59ML oral liquid 30 g  30 g Oral Q15 Min PRN    Or    glucose (Glutose) 40% oral gel 30 g  30 g Oral Q15 Min PRN    Or    glucose-vitamin C (Dex-4) chewable tab 8 tablet  8 tablet Oral Q15 Min PRN    insulin aspart (NovoLOG) 100 Units/mL FlexPen 2-10 Units  2-10 Units Subcutaneous TID AC and HS     OBJECTIVE  Vitals:  /68 (BP Location: Right arm)   Pulse 86   Temp 97.3 °F (36.3 °C) (Temporal)   Resp 20   Ht 160 cm (5' 3\")   Wt 223 lb 8.7 oz (101.4 kg)   LMP 08/08/2015   SpO2 100%   BMI 39.60 kg/m²           Physical Exam:    General Appearance: Alert, cooperative, no distress, appears stated age  Neck: Supple, symmetrical, trachea midline, no carotid bruits, adenopathy, or JVD  Lungs: Clear to auscultation bilaterally, respirations unlabored  Heart: Regular rate and rhythm, S1 and S2 normal, no murmur, rub or gallop  Abdomen: Soft, non-tender, bowel sounds active all four quadrants, no masses, no organomegaly  Extremities: Extremities normal, atraumatic, no cyanosis or edema, capillary refill <3 sec.    Pulses: 2+ and symmetric all extremities  Skin: Skin color, texture, turgor normal for ethnicity, no rashes or lesions, warm and dry  Neurologic: This patient is alert and orientated x 3.  Speech fluent. Pupils equally round and reactive to light.  3+ brisk bilaterally.  EOMs  intact.  Visual fields are full.  Tongue is midline. Face is symmetrical. Uvula and palate elevate symmetrically.  Shrug shoulders normal bilaterally.  The rest of the cranial nerves are grossly intact.  Sensation to light touch is intact bilaterally.  Motor: No Drift. No arm or leg weakness noted. 5/5 bilaterally.  Finger-to-nose coordination is intact.  Gait deferred.    Data this admission:  MRI BRAIN (W+WO) (CPT=70553)    Result Date: 1/16/2024  CONCLUSION:   1. There is suggestion of a cavernoma in the right temporal lobe that measures approximately 1.2 x 0.9 cm.  Direct correlation with prior CT examination be helpful.  2. There is thrombosis of the right transverse and sigmoid sinuses.  There is extension of this thrombus into the proximal right internal jugular vein.  3. No evidence of an acute infarct.  This critical result was discussed with Dr. Dorado at 1512 hours on 1/16/2024. Read back was performed.    LOCATION:  [facility]    Dictated by (CST): Kwesi Viveros MD on 1/16/2024 at 3:05 PM     Finalized by (CST): Kwesi Viveros MD on 1/16/2024 at 3:12 PM       Labs:  Lab Results   Component Value Date    WBC 8.5 01/16/2024    HGB 13.2 01/16/2024    HCT 39.1 01/16/2024    .0 01/16/2024    CREATSERUM 1.01 01/16/2024    BUN 19 01/16/2024     01/16/2024    K 4.4 01/16/2024     01/16/2024    CO2 22.0 01/16/2024     01/16/2024    CA 9.2 01/16/2024    ALB 3.3 01/16/2024    ALKPHO 79 01/16/2024    BILT 0.2 01/16/2024    TP 7.4 01/16/2024    AST 15 01/16/2024    ALT 24 01/16/2024    PTT 29.1 01/16/2024    INR 1.03 01/16/2024     Assessment/Plan:  Thrombosis of right transverse sigmoid sinus  -Lovenox 1mg/kg BID  -MRA w/wo in am (PRN Versed for anxiety during test)  -Neuro checks Q1h  -CT head prior to switching to oral anticoagulation    Right temporal cavernoma  -No acute surgical intervention needed  -MRA tomorrow    Headache  -PRN pain medication    Sinusitis  -Augmentin    5.      DM2  -Accucheck with ISS  -Levemir   -Per IM    F/E/N:  -IV fluids  -Follow lytes and replete prn    Proph:  -SQ Lovenox therapeutic dose 1mg/kg BID  -SCD    Dispo:     Code Status: Full Code  -Neuro ICU monitoring    Plan of care discussed with Neuro-Intensivist On-Call, Dr. Estrada.     Sugey ASHRAF  Critical Care Nurse Practitioner  Spec [address]    A total of 35 minutes of critical care time (exclusive of billable procedures) was administered. This involved direct patient intervention, complex decision making, and/or extensive discussions with the patient, family, and clinical staff.    The 21st Century Cures Act makes medical notes like these available to patients in the interest of transparency. Please be advised this is a medical document. Medical documents are intended to carry relevant information, facts as evident, and the clinical opinion of the practitioner. The medical note is intended as peer to peer communication and may appear blunt or direct. It is written in medical language and may contain abbreviations or verbiage that are unfamiliar.

## 2024-01-17 NOTE — PLAN OF CARE
Patient VSS. No issues with major headaches.  Neuro assessment intact with no change in baseline.  Patient to CT with possible acute changes in result.  MD informed and a request was make for a previous CT sinus to compare for an incidental finding of a cayernoma.  The process was deemed difficult because [facility] and [facility] could not send the images in a convenient way.  They required some one to  the CD from their location.  Charge nurse made aware.  Patient to bathroom, where incidentally she got locked in the bathroom.  Facilities was able to get her out.

## 2024-01-17 NOTE — PLAN OF CARE
Received pt from ER approximately at 2130 last night.  AOx4. Neuro check Q2 hours. Neuro intact.  C/o right side of headache and nausea; Norco and Zofran given.  Heparin gtt stopped at 2327 and Lovenox given right away per order.  Ambulates to bathroom without difficulty.  MRA/MRV ordered.  Up in chair this morning.  Plan of care discussed with pt.  Call light within reach. Continue to monitor.

## 2024-01-17 NOTE — PROGRESS NOTES
CC: follow-up hospital admission venous sinus thrombosis    SUBJECTIVE:  Interval History:     Still with headache, on and off, now on contralateral site too but improved with sleep      OBJECTIVE:  Scheduled Meds:    fluticasone propionate  1 spray Each Nare Daily    amoxicillin potassium and clavulanate  875 mg Oral Q12H    spironolactone  50 mg Oral Daily    rosuvastatin  10 mg Oral Nightly    pantoprazole  40 mg Oral QAM AC    metoprolol succinate ER  25 mg Oral Daily Beta Blocker    insulin detemir  80 Units Subcutaneous Nightly    insulin aspart  10 Units Subcutaneous TID CC    ezetimibe  10 mg Oral Nightly    insulin aspart  2-10 Units Subcutaneous TID AC and HS    enoxaparin  1 mg/kg Subcutaneous 2 times per day     Continuous Infusions:   PRN Meds: sodium chloride, acetaminophen, ondansetron, prochlorperazine, glucose **OR** glucose **OR** glucose-vitamin C **OR** dextrose **OR** glucose **OR** glucose **OR** glucose-vitamin C, HYDROcodone-acetaminophen **OR** HYDROcodone-acetaminophen, hydrALAzine    PHYSICAL EXAM  Vital signs: Temp:  [97.3 °F (36.3 °C)-98.2 °F (36.8 °C)] 97.8 °F (36.6 °C)  Pulse:  [68-86] 75  Resp:  [12-24] 16  BP: (109-178)/() 136/71  SpO2:  [93 %-100 %] 95 %      GENERAL - NAD, AAO  EYES- sclera anicteric,   HENT- normocephalic, OP - MMM  NECK - supple  CV- RRR  RESP - CTAB, normal resp effort  ABDOMEN- soft, NT/ND   EXT- no LE edema,   PSYCH - normal mentation/ normal affect    Data Review:   Labs:   Recent Labs   Lab 01/12/24 2056 01/16/24  1247 01/17/24  0424   WBC 7.7 8.5 6.8   HGB 14.4 13.2 12.1   MCV 84.7 82.7 85.2   .0 307.0 245.0   INR  --  1.03  --        Recent Labs   Lab 01/12/24 2056 01/16/24  1247 01/17/24  0424   * 138 137   K 4.4 4.4 4.0    106 107   CO2 23.0 22.0 26.0   BUN 26* 19 20   CREATSERUM 1.14* 1.01 0.89   CA 9.6 9.2 8.7   MG  --   --  1.8   * 239* 195*       Recent Labs   Lab 01/16/24  1247   ALT 24   AST 15   ALB 3.3*        Recent Labs   Lab 01/12/24 2012 01/16/24  1853 01/16/24  2244 01/17/24  0510   PGLU 328* 231* 288* 169*           ASSESSMENT/PLAN:    Patient is a 58 year old female with PMH sig for htn, hld, dm2, here for headaches, nausea     Impression     -R temporal cavernoma and thrombosis of R transverse sigmoid sinus   -acute sinusitis      -HTN  -HLD  -DM2  -morbid obesity, BMI 40     Plan     *neuro / neurosx  -IV heparin - >lovenox  Repeat brain imaging per neuro / neurosx  -neuro / nsg following     *sinusitis  -augmentin     *HLD  -statin     *DM 2  -at home on Toujeo 100 qpm and novolog 10 with sliding scale  -start with levemir 80 uints and novolog / iss - controlled for now     Scds  lovenox      Will continue to follow while hospitalized. Please page me or the on-call hospitalist with questions or concerns.    Yimi Dumont Hospitalist  [phone]  Answering Service: [phone]

## 2024-01-17 NOTE — PROGRESS NOTES
[facility]  Neurosurgery Progress Note    Karen Mtz Patient Status:  Inpatient    1965 MRN [id number]   Location [facility] 6NE-A Attending Jm Adan MD   Hosp Day # 1 PCP Jolene Ocampo MD     Chief Complaint:  Abnormal findings on CT  Headaches    Subjective:  No acute events overnight. Awaiting MRA head. On therapeutic Lovenox.    Objective/Physical Exam:    Vital Signs:  Blood pressure 137/79, pulse 65, temperature 97.8 °F (36.6 °C), temperature source Temporal, resp. rate 13, height 63\", weight 226 lb 3.1 oz (102.6 kg), last menstrual period 2015, SpO2 94%, not currently breastfeeding.  Respiratory:  nonlabored  CV:  well perfused  General: NAD, Speech Fluent, Mood Appropriate  Neurologic:   A&Ox3  PERRL, EOMi, FS, TM  Full strength x 4, no drift    Labs:  Recent Labs   Lab 247 24  0424   RBC 5.18 4.73 4.33   HGB 14.4 13.2 12.1   HCT 43.9 39.1 36.9   MCV 84.7 82.7 85.2   MCH 27.8 27.9 27.9   MCHC 32.8 33.8 32.8   RDW 14.1 13.8 13.8   NEPRELIM 5.18 4.69  --    WBC 7.7 8.5 6.8   .0 307.0 245.0       Recent Labs   Lab 247 24  0424   * 239* 195*   BUN 26* 19 20   CREATSERUM 1.14* 1.01 0.89   EGFRCR 55* 64 75   CA 9.6 9.2 8.7   * 138 137   K 4.4 4.4 4.0    106 107   CO2 23.0 22.0 26.0       Imaging:  MRI BRAIN (W+WO) (CPT=70553)    Result Date: 2024  CONCLUSION:   1. There is suggestion of a cavernoma in the right temporal lobe that measures approximately 1.2 x 0.9 cm.  Direct correlation with prior CT examination be helpful.  2. There is thrombosis of the right transverse and sigmoid sinuses.  There is extension of this thrombus into the proximal right internal jugular vein.  3. No evidence of an acute infarct.  This critical result was discussed with Dr. Dorado at 1512 hours on 2024. Read back was performed.    LOCATION:  [facility]    Dictated by (CST): Jackeline  MD Kwesi on 1/16/2024 at 3:05 PM     Finalized by (CST): Kwesi Viveros MD on 1/16/2024 at 3:12 PM           Assessment:  59 yo female with right transverse sinus thrombosis and right temporal cavernoma    Plan:  MRA Brain pending to r/o fistula  Repeat head CT after 2nd dose to determine stability   Neuro checks      Is this a shared or split note between Advanced Practice Provider and Physician? Yes       PASCALE Bonds  [facility]  1/17/2024, 11:40 AM  Spectre [id number]

## 2024-01-18 ENCOUNTER — APPOINTMENT (OUTPATIENT)
Dept: CT IMAGING | Facility: HOSPITAL | Age: 59
End: 2024-01-18
Attending: Other
Payer: COMMERCIAL

## 2024-01-18 LAB
ANION GAP SERPL CALC-SCNC: 6 MMOL/L (ref 0–18)
BUN BLD-MCNC: 17 MG/DL (ref 9–23)
CALCIUM BLD-MCNC: 8.9 MG/DL (ref 8.5–10.1)
CHLORIDE SERPL-SCNC: 107 MMOL/L (ref 98–112)
CO2 SERPL-SCNC: 26 MMOL/L (ref 21–32)
CREAT BLD-MCNC: 0.81 MG/DL
EGFRCR SERPLBLD CKD-EPI 2021: 84 ML/MIN/1.73M2 (ref 60–?)
ERYTHROCYTE [DISTWIDTH] IN BLOOD BY AUTOMATED COUNT: 13.6 %
GLUCOSE BLD-MCNC: 110 MG/DL (ref 70–99)
GLUCOSE BLD-MCNC: 131 MG/DL (ref 70–99)
GLUCOSE BLD-MCNC: 153 MG/DL (ref 70–99)
GLUCOSE BLD-MCNC: 163 MG/DL (ref 70–99)
GLUCOSE BLD-MCNC: 209 MG/DL (ref 70–99)
HCT VFR BLD AUTO: 37.9 %
HGB BLD-MCNC: 12.6 G/DL
MAGNESIUM SERPL-MCNC: 1.8 MG/DL (ref 1.6–2.6)
MCH RBC QN AUTO: 27.9 PG (ref 26–34)
MCHC RBC AUTO-ENTMCNC: 33.2 G/DL (ref 31–37)
MCV RBC AUTO: 83.8 FL
OSMOLALITY SERPL CALC.SUM OF ELEC: 293 MOSM/KG (ref 275–295)
PLATELET # BLD AUTO: 287 10(3)UL (ref 150–450)
POTASSIUM SERPL-SCNC: 4.3 MMOL/L (ref 3.5–5.1)
RBC # BLD AUTO: 4.52 X10(6)UL
SODIUM SERPL-SCNC: 139 MMOL/L (ref 136–145)
WBC # BLD AUTO: 8.3 X10(3) UL (ref 4–11)

## 2024-01-18 PROCEDURE — 99291 CRITICAL CARE FIRST HOUR: CPT | Performed by: OTHER

## 2024-01-18 PROCEDURE — 99291 CRITICAL CARE FIRST HOUR: CPT | Performed by: NEUROLOGICAL SURGERY

## 2024-01-18 PROCEDURE — 70450 CT HEAD/BRAIN W/O DYE: CPT | Performed by: OTHER

## 2024-01-18 RX ORDER — CETIRIZINE HYDROCHLORIDE 10 MG/1
10 TABLET ORAL DAILY
Status: DISCONTINUED | OUTPATIENT
Start: 2024-01-18 | End: 2024-01-19

## 2024-01-18 RX ORDER — MAGNESIUM OXIDE 400 MG/1
400 TABLET ORAL ONCE
Status: COMPLETED | OUTPATIENT
Start: 2024-01-18 | End: 2024-01-18

## 2024-01-18 RX ORDER — SIMETHICONE 80 MG
80 TABLET,CHEWABLE ORAL 4 TIMES DAILY PRN
Status: DISCONTINUED | OUTPATIENT
Start: 2024-01-18 | End: 2024-01-19

## 2024-01-18 NOTE — PROGRESS NOTES
[facility]   NEUROCRITICAL CARE   PROGRESS NOTE    Admission date: 1/16/2024  Reason for Consult: VST  Chief Complaint:   Chief Complaint   Patient presents with    Abnormal Result   ________________________________________________________________    SUBJECTIVE     24 Hour Significant Events: Repeat CT head pending this morning. Mild HA.     OBJECTIVE   VITAL SIGNS:   Temp:  [97.2 °F (36.2 °C)-97.8 °F (36.6 °C)] 97.2 °F (36.2 °C)  Pulse:  [65-95] 77  Resp:  [11-28] 20  BP: (124-158)/(60-87) 143/72  SpO2:  [92 %-100 %] 94 %    INTAKE/OUTPUT:  Intake/Output Summary (Last 24 hours) at 1/18/2024 0918  Last data filed at 1/18/2024 0800  Gross per 24 hour   Intake 300 ml   Output --   Net 300 ml     PHYSICAL EXAM:  GENERAL APPEARANCE: Patient resting comfortably in chair, NAD  HEENT: Normocephalic, atraumatic.   LUNGS: Normal respiratory rate and effort   HEART: Regular rate and rhythm   ABDOMEN: Soft. No tenderness, guarding, or rebound.     NEUROLOGIC:    Mental Status:  A&O x 4, Follows simple commands, no obvious aphasia or dysarthria  Cranial nerves: PERRL.  Visual fields full.  EOMI.  Face symmetric with normal movement bilaterally.  Hearing grossly intact. Tongue midline with normal movements.   Motor: Drift:  Absent; Motor exam is 5 out of 5 in all extremities bilaterally  Sensation: Intact to light touch bilaterally  Cerebellar: Normal Finger-To-Nose test    LABORATORY DATA:  Last 24 hour labs were reviewed in detail.  Recent Labs   Lab 01/16/24 1247 01/17/24 0424 01/18/24  0432    137 139   K 4.4 4.0 4.3    107 107   CO2 22.0 26.0 26.0   * 195* 153*   BUN 19 20 17   CREATSERUM 1.01 0.89 0.81     Recent Labs   Lab 01/16/24 1247 01/17/24  0424 01/18/24  0432   WBC 8.5 6.8 8.3   HGB 13.2 12.1 12.6   .0 245.0 287.0     Recent Labs   Lab 01/16/24  1247   ALT 24   AST 15     Recent Labs   Lab 01/17/24  0424 01/18/24  0432   MG 1.8 1.8       Scheduled Meds:   fluticasone  propionate  1 spray Each Nare Daily    amoxicillin potassium and clavulanate  875 mg Oral Q12H    spironolactone  50 mg Oral Daily    rosuvastatin  10 mg Oral Nightly    pantoprazole  40 mg Oral QAM AC    metoprolol succinate ER  25 mg Oral Daily Beta Blocker    insulin detemir  80 Units Subcutaneous Nightly    insulin aspart  10 Units Subcutaneous TID CC    ezetimibe  10 mg Oral Nightly    insulin aspart  2-10 Units Subcutaneous TID AC and HS    enoxaparin  1 mg/kg Subcutaneous 2 times per day     Continuous Infusions:  PRN Meds:albuterol, sodium chloride, acetaminophen, ondansetron, prochlorperazine, glucose **OR** glucose **OR** glucose-vitamin C **OR** dextrose **OR** glucose **OR** glucose **OR** glucose-vitamin C, HYDROcodone-acetaminophen **OR** HYDROcodone-acetaminophen, hydrALAzine    Radiology:    MRV HEAD (W+WO)(FKR=49721)    Result Date: 1/18/2024  CONCLUSION:  Right transverse and sigmoid sinus thrombosis with extension into the internal jugular vein similar to the prior brain MRI study with contrast.   LOCATION:  [facility]   Dictated by (CST): Jesus Sahni MD on 1/18/2024 at 0:05 AM     Finalized by (CST): Jesus Sahni MD on 1/18/2024 at 0:10 AM       MRA, HEAD (W+WO) (CPT=70546)    Result Date: 1/18/2024  CONCLUSION:  1. Unremarkable MRA [id number] noting variant anatomy in the distal right vertebral artery. 2. Thrombosis of the right transverse and sigmoid sinuses extending to the internal jugular vein is again seen.    LOCATION:  [facility]   Dictated by (CST): Jesus Sahni MD on 1/17/2024 at 11:53 PM     Finalized by (CST): Jesus Sahni MD on 1/18/2024 at 0:01 AM       CT BRAIN OR HEAD ([id number])    Result Date: 1/17/2024  CONCLUSION:   1. There is a small parenchymal hematoma in the right temporal lobe with mild surrounding edema.  The hematoma has a reference measurement of approximately 10 x 8 x 5 mm.  This area has a gross reference measurement of 10 x 7 mm on the previous MRI.  An underlying mass or  vascular malformation are not entirely excluded.  Clinical correlation recommended.  Follow-up MRI of the brain with without contrast in 2-3 months is suggested to assess for an underlying lesion.  2. Minimal chronic microvascular ischemic changes in the cerebral white matter.  No evidence of acute territorial infarction.  3. Asymmetric hyperdensity of the right sigmoid sinus, right jugular vein, and right transverse sinus due to known venous sinus thrombosis.  Critical results were discussed with the patient's care nurse Danish at 1239 hours on 1/17/2024. Critical results were read back.  LOCATION:  [facility]   Dictated by (CST): Mynor Grace MD on 1/17/2024 at 12:37 PM     Finalized by (CST): Mynor Grace MD on 1/17/2024 at 12:41 PM      ASSESSMENT/PLAN   58 year old female  with PMH of HTN, HLD, DM and asthma who presents R transverse sigmoid venous sinus thrombosis     Neurological:  Right transverse/sigmoid venous sinus thrombosis         - CT without bleed, and no neurologic deficit at this time         - Continue therapeutic Lovenox         - No obvious etiology noted per history, possibly in setting of underlying sinus infection with right sinus mucosal thickening noted on imaging         - Repeat CT after second dose to eval for stability demonstrating hyperdensity in region of cavernoma, unclear if new or not given OSH CT read mention of both hyper and hypodensity.         - MRA w/ and w/o done, MRV stable           - Repeat CT pending this AM         - Heme consult for further eval if needed and choice of oral AC          - No further inpatient neuro workup needed, will follow peripherally, call if further questions or concerns         - PT/OT evals     Right temporal cavernoma         - Noted on outpatient imaging, confirmed on MRI.         - Nsg aware, following      Cardiovascular:  HTN/HLD         - SBP goal as noted above          - Continue home medications         - IV labetalol/hydralazine  pushes prn            Pulmonary:  Asthma/ALEXY  - Satting well on RA, nebs prn, CPAP at night prn     Renal: - Monitor I&Os       Gastrointestinal:  Nutrition:        - Diabetic diet         - Bowel regimen: ordered prn     Infectious Disease:   Sinusitis - Noted on MRI, on abx per IM, afebrile, continue to monitor      Heme/Onc - Hb goal > 7, continue to monitor daily      Endocrine:  DM Type II, uncontrolled          - Hold PO Meds in the hospital         - HbA1c 11.9         - Accuchecks q6 with SSI prn      Checklist         - Lines: PIVs         - DVT Prophylaxis: SCDs and Lovenox therapeutic          - Diet: ADAT         - IVF: -         - Electrolytes: Replete per protocol         - Code Status: FULL      Dispo: CNICU pending stability scan     Greater than 40 minutes of critical care time (exclusive of billable procedures) was administered to manage and/or prevent neurologic instability. This involved direct patient intervention, complex decision making, and/or extensive discussions with the patient, family, and clinical staff.     Nan Estrada MD  Neurocritical Care  [facility]     Disclaimer: This record was dictated using Dragon software. There may be errors due to voice recognition problems that were not realized and corrected during the completion of the note.

## 2024-01-18 NOTE — PROGRESS NOTES
[facility]  Neurosurgery Progress Note    Karen Mtz Patient Status:  Inpatient    1965 MRN [id number]   Location [facility] 6NE-A Attending Yimi De La Cruz, DO   Hosp Day # 2 PCP Jolene Ocampo MD     Chief Complaint:  Abnormal findings on CT  Headaches     Subjective:  No acute events overnight. MRA without dAVF. Mild HA      Objective/Physical Exam:    Vital Signs:  Blood pressure 134/62, pulse 76, temperature 98 °F (36.7 °C), temperature source Temporal, resp. rate 25, height 63\", weight 224 lb 13.9 oz (102 kg), last menstrual period 2015, SpO2 95%, not currently breastfeeding.  Respiratory:  nonlabored  CV:  well perfused  General: NAD, Speech Fluent, Mood Appropriate  Neurologic:   A&Ox3  PERRL, EOMi, FS, TM  Full strength x 4, no drift      Labs:  Recent Labs   Lab 24  1247 24  0424 24  0432   RBC 5.18 4.73 4.33 4.52   HGB 14.4 13.2 12.1 12.6   HCT 43.9 39.1 36.9 37.9   MCV 84.7 82.7 85.2 83.8   MCH 27.8 27.9 27.9 27.9   MCHC 32.8 33.8 32.8 33.2   RDW 14.1 13.8 13.8 13.6   NEPRELIM 5.18 4.69  --   --    WBC 7.7 8.5 6.8 8.3   .0 307.0 245.0 287.0       Recent Labs   Lab 24  0424 24  0432   * 195* 153*   BUN 19 20 17   CREATSERUM 1.01 0.89 0.81   EGFRCR 64 75 84   CA 9.2 8.7 8.9    137 139   K 4.4 4.0 4.3    107 107   CO2 22.0 26.0 26.0       Imaging:    Previous CT head from [facility] 24    CT BRAIN OR HEAD ([id number])    Result Date: 2024  CONCLUSION:  Minimal reduction in size of right temporal lobe hemorrhage.    LOCATION:  [place]   Dictated by (CST): Kwesi Viveros MD on 2024 at 9:42 AM     Finalized by (CST): Kwesi Viveros MD on 2024 at 9:47 AM       MRV HEAD (W+WO)(UMS=[id number])    Result Date: 2024  CONCLUSION:  Right transverse and sigmoid sinus thrombosis with extension into the internal jugular vein similar to the prior brain MRI study with contrast.    LOCATION:  [facility]   Dictated by (CST): Jesus Sahni MD on 1/18/2024 at 0:05 AM     Finalized by (CST): Jesus Sahni MD on 1/18/2024 at 0:10 AM       MRA, HEAD (W+WO) (CPT=70546)    Result Date: 1/18/2024  CONCLUSION:  1. Unremarkable MRA [id number] noting variant anatomy in the distal right vertebral artery. 2. Thrombosis of the right transverse and sigmoid sinuses extending to the internal jugular vein is again seen.    LOCATION:  [facility]   Dictated by (CST): Jesus Sahni MD on 1/17/2024 at 11:53 PM     Finalized by (CST): Jesus Sahni MD on 1/18/2024 at 0:01 AM       CT BRAIN OR HEAD ([id number])    Result Date: 1/17/2024  CONCLUSION:   1. There is a small parenchymal hematoma in the right temporal lobe with mild surrounding edema.  The hematoma has a reference measurement of approximately 10 x 8 x 5 mm.  This area has a gross reference measurement of 10 x 7 mm on the previous MRI.  An underlying mass or vascular malformation are not entirely excluded.  Clinical correlation recommended.  Follow-up MRI of the brain with without contrast in 2-3 months is suggested to assess for an underlying lesion.  2. Minimal chronic microvascular ischemic changes in the cerebral white matter.  No evidence of acute territorial infarction.  3. Asymmetric hyperdensity of the right sigmoid sinus, right jugular vein, and right transverse sinus due to known venous sinus thrombosis.  Critical results were discussed with the patient's care nurse Danish at 1239 hours on 1/17/2024. Critical results were read back.  LOCATION:  [id number]   Dictated by (CST): Mynor Grace MD on 1/17/2024 at 12:37 PM     Finalized by (CST): Mynor Grace MD on 1/17/2024 at 12:41 PM       MRI BRAIN (W+WO) (CPT=70553)    Result Date: 1/16/2024  CONCLUSION:   1. There is suggestion of a cavernoma in the right temporal lobe that measures approximately 1.2 x 0.9 cm.  Direct correlation with prior CT examination be helpful.  2. There is thrombosis of the right  transverse and sigmoid sinuses.  There is extension of this thrombus into the proximal right internal jugular vein.  3. No evidence of an acute infarct.  This critical result was discussed with Dr. Dorado at 1512 hours on 1/16/2024. Read back was performed.    LOCATION:  [facility]    Dictated by (CST): Kwesi Viveros MD on 1/16/2024 at 3:05 PM     Finalized by (CST): Kwesi Viveros MD on 1/16/2024 at 3:12 PM         Assessment:  59 yo female with right transverse sinus thrombosis and right temporal cavernoma  Right temporal hemorrhage     Plan:  MRA Brain negative for fistula  Repeat head CT stable   Reviewed previous head CT from [facility] and hyperdense mass is consistent with a cavernoma  Neuro checks  OK to switch to oral AC  Follow up in 2 weeks with a repeat head CT  OK to DC from a Neurosurgical standpoint      Is this a shared or split note between Advanced Practice Provider and Physician? Yes       PASCALE Bonds  [facility] [place]  1/18/2024, 5:02 PM  Spectre [id number]

## 2024-01-18 NOTE — PLAN OF CARE
Assumed care at 1930.  AOx4. Neuro intact.  Refused CPAP.  C/o right side of headache; Norco given for pain management.  MRA/MRV completed.  Continue on Lovenox SubQ.  Ambulates in room.  Son will obtain CT CD from [facility] today.  Plan of care discussed with pt.  Call light within reach. Continue to monitor.

## 2024-01-18 NOTE — PROGRESS NOTES
CC: follow-up hospital admission venous sinus thrombosis    SUBJECTIVE:  Interval History:     No acute issues overnight  CT with R sided bleed but in retrospect and looking at prior images, it has been there on prior scan before AC was started. Appears stable on repeat scans after being on AC    OBJECTIVE:  Scheduled Meds:    cetirizine  10 mg Oral Daily    fluticasone propionate  1 spray Each Nare Daily    amoxicillin potassium and clavulanate  875 mg Oral Q12H    spironolactone  50 mg Oral Daily    rosuvastatin  10 mg Oral Nightly    pantoprazole  40 mg Oral QAM AC    metoprolol succinate ER  25 mg Oral Daily Beta Blocker    insulin detemir  80 Units Subcutaneous Nightly    insulin aspart  10 Units Subcutaneous TID CC    ezetimibe  10 mg Oral Nightly    insulin aspart  2-10 Units Subcutaneous TID AC and HS    enoxaparin  1 mg/kg Subcutaneous 2 times per day     Continuous Infusions:   PRN Meds: simethicone, albuterol, sodium chloride, acetaminophen, ondansetron, prochlorperazine, glucose **OR** glucose **OR** glucose-vitamin C **OR** dextrose **OR** glucose **OR** glucose **OR** glucose-vitamin C, HYDROcodone-acetaminophen **OR** HYDROcodone-acetaminophen, hydrALAzine    PHYSICAL EXAM  Vital signs: Temp:  [97.2 °F (36.2 °C)-97.8 °F (36.6 °C)] 97.8 °F (36.6 °C)  Pulse:  [68-95] 77  Resp:  [11-28] 21  BP: (124-158)/(56-87) 125/56  SpO2:  [92 %-100 %] 97 %      GENERAL - NAD, AAO  EYES- sclera anicteric,   HENT- normocephalic, OP - dry  NECK - supple  CV- RRR  RESP - CTAB, normal resp effort  ABDOMEN- soft   EXT- no LE edema,   PSYCH - normal mentation/ normal affect    Data Review:   Labs:   Recent Labs   Lab 01/12/24 2056 01/16/24  1247 01/17/24  0424 01/18/24  0432   WBC 7.7 8.5 6.8 8.3   HGB 14.4 13.2 12.1 12.6   MCV 84.7 82.7 85.2 83.8   .0 307.0 245.0 287.0   INR  --  1.03  --   --        Recent Labs   Lab 01/12/24 2056 01/16/24  1247 01/17/24  0424 01/18/24  0432   * 138 137 139   K 4.4 4.4 4.0  4.3    106 107 107   CO2 23.0 22.0 26.0 26.0   BUN 26* 19 20 17   CREATSERUM 1.14* 1.01 0.89 0.81   CA 9.6 9.2 8.7 8.9   MG  --   --  1.8 1.8   * 239* 195* 153*       Recent Labs   Lab 01/16/24  1247   ALT 24   AST 15   ALB 3.3*       Recent Labs   Lab 01/17/24  1058 01/17/24  1724 01/17/24  2130 01/18/24  0623 01/18/24  1147   PGLU 150* 139* 109* 110* 163*           ASSESSMENT/PLAN:    Patient is a 58 year old female with PMH sig for htn, hld, dm2, here for headaches, nausea     Impression     -R temporal cavernoma and thrombosis of R transverse sigmoid sinus   -acute sinusitis      -HTN  -HLD  -DM2  -morbid obesity, BMI 40     Plan     *neuro / neurosx  -IV heparin - >lovenox. Heme consulted for recs for home  Repeat brain imaging per neuro / neurosx has been stable  -neuro / nsg following     *sinusitis  -augmentin     *HLD  -statin     *DM 2  -at home on Toujeo 100 qpm and novolog 10 with sliding scale  -start with levemir 80 uints and novolog / iss - controlled for now     Scds  lovenox    Dispo - likely tomorrow    Will continue to follow while hospitalized. Please page me or the on-call hospitalist with questions or concerns.    Yimi Dumont Hospitalist  [phone]  Answering Service: [phone]

## 2024-01-19 VITALS
RESPIRATION RATE: 17 BRPM | HEIGHT: 63 IN | HEART RATE: 83 BPM | TEMPERATURE: 98 F | DIASTOLIC BLOOD PRESSURE: 67 MMHG | BODY MASS INDEX: 39.84 KG/M2 | OXYGEN SATURATION: 98 % | SYSTOLIC BLOOD PRESSURE: 134 MMHG | WEIGHT: 224.88 LBS

## 2024-01-19 LAB
ANION GAP SERPL CALC-SCNC: 3 MMOL/L (ref 0–18)
BUN BLD-MCNC: 19 MG/DL (ref 9–23)
CALCIUM BLD-MCNC: 8.9 MG/DL (ref 8.5–10.1)
CHLORIDE SERPL-SCNC: 108 MMOL/L (ref 98–112)
CO2 SERPL-SCNC: 26 MMOL/L (ref 21–32)
CREAT BLD-MCNC: 0.94 MG/DL
EGFRCR SERPLBLD CKD-EPI 2021: 70 ML/MIN/1.73M2 (ref 60–?)
ERYTHROCYTE [DISTWIDTH] IN BLOOD BY AUTOMATED COUNT: 13.9 %
GLUCOSE BLD-MCNC: 109 MG/DL (ref 70–99)
GLUCOSE BLD-MCNC: 146 MG/DL (ref 70–99)
GLUCOSE BLD-MCNC: 202 MG/DL (ref 70–99)
HCT VFR BLD AUTO: 44 %
HGB BLD-MCNC: 14.2 G/DL
MAGNESIUM SERPL-MCNC: 1.7 MG/DL (ref 1.6–2.6)
MCH RBC QN AUTO: 28.3 PG (ref 26–34)
MCHC RBC AUTO-ENTMCNC: 32.3 G/DL (ref 31–37)
MCV RBC AUTO: 87.6 FL
OSMOLALITY SERPL CALC.SUM OF ELEC: 289 MOSM/KG (ref 275–295)
PLATELET # BLD AUTO: 301 10(3)UL (ref 150–450)
POTASSIUM SERPL-SCNC: 3.9 MMOL/L (ref 3.5–5.1)
RBC # BLD AUTO: 5.02 X10(6)UL
SODIUM SERPL-SCNC: 137 MMOL/L (ref 136–145)
WBC # BLD AUTO: 8.4 X10(3) UL (ref 4–11)

## 2024-01-19 RX ORDER — MAGNESIUM OXIDE 400 MG/1
400 TABLET ORAL ONCE
Status: COMPLETED | OUTPATIENT
Start: 2024-01-19 | End: 2024-01-19

## 2024-01-19 RX ORDER — INSULIN GLARGINE 300 U/ML
110 INJECTION, SOLUTION SUBCUTANEOUS DAILY
Status: SHIPPED | COMMUNITY
Start: 2024-01-19

## 2024-01-19 RX ORDER — HYDROCODONE BITARTRATE AND ACETAMINOPHEN 5; 325 MG/1; MG/1
TABLET ORAL EVERY 6 HOURS PRN
Status: DISCONTINUED | OUTPATIENT
Start: 2024-01-19 | End: 2024-01-19

## 2024-01-19 RX ORDER — TOPIRAMATE 100 MG/1
50 TABLET, FILM COATED ORAL 2 TIMES DAILY
Status: SHIPPED | COMMUNITY
Start: 2024-01-19

## 2024-01-19 NOTE — PAYOR COMM NOTE
--------------  ADMISSION REVIEW     Payor: DASHAWN WEBER/ANGELINA  Subscriber #:  [id number]  Authorization Number: [id number]    Admit date: 1/16/24  Admit time:  9:17 PM       REVIEW DOCUMENTATION:     ED Provider Notes        ED Provider Notes signed by Rambo Dorado MD at 1/16/2024  2:39 PM       Author: Rambo Dorado MD Service: -- Author Type: Physician    Filed: 1/16/2024  2:39 PM Date of Service: 1/16/2024  1:10 PM Status: Signed    : Rambo Dorado MD (Physician)           Patient Seen in: [facility] Emergency Department      History     Chief Complaint   Patient presents with    Abnormal Result     Stated Complaint: abnormal ct    Subjective:   HPI    58-year-old woman history of diabetes here for evaluation of abnormal CT at immediate care.  Patient presents to the immediate care reporting over 1 week of sinus congestion developed right-sided facial pain.  Went to her had a CT of the head CT sinuses that showed right-sided maxillary sinusitis but also demonstrated small mass in the right temporal lobe approximately 6 x 9 mm.  Patient states she has some discomfort along the posterior cervical chain of lymph nodes, denies any focal weakness or numbness any vision changes any headaches, any other concerning symptoms at this time.    Objective:   Past Medical History:   Diagnosis Date    Anemia     DIABETES 2005    Diabetes mellitus type II, uncontrolled     Disorder of liver     FATTY LIVER    Diverticulosis     Esophageal reflux     Extrinsic asthma, unspecified     ALLERGY INDUCED    HYPERLIPIDEMIA     HYPERTRIGLYCERIDEMIA     Hypertriglyceridemia     ALEXY (obstructive sleep apnea) 5/23/2020 HST    AHI 30 Supine AHI 55 non-supine AHI 24 [place] 79%    Other and unspecified hyperlipidemia     Type II or unspecified type diabetes mellitus without mention of complication, not stated as uncontrolled     Unspecified essential hypertension      Past Surgical History:   Procedure Laterality Date           COLONOSCOPY      COLONOSCOPY,DIAGNOSTIC N/A 10/10/2015    Procedure: ESOPHAGOGASTRODUODENOSCOPY, COLONOSCOPY, POSSIBLE BIOPSY, POSSIBLE POLYPECTOMY 44801, 25922;  Surgeon: Ishan Fournier MD;  Location: [facility]    LUMPECTOMY RIGHT Right 2023    OTHER SURGICAL HISTORY   [facility]    ENDOSCOPY X'S 3    UPPER GI ENDOSCOPY,DIAGNOSIS N/A 10/10/2015    Procedure: ESOPHAGOGASTRODUODENOSCOPY, COLONOSCOPY, POSSIBLE BIOPSY, POSSIBLE POLYPECTOMY 55104, 41520;  Surgeon: Ishan Fournier MD;  Location: [facility]       Physical Exam     ED Triage Vitals   BP 24 1208 (!) 161/94   Pulse 24 1208 80   Resp 24 1208 16   Temp 24 1208 98.2 °F (36.8 °C)   Temp src 24 1208 Oral   SpO2 24 1208 99 %   O2 Device 24 1201 None (Room air)       Current:/81   Pulse 81   Temp 98.2 °F (36.8 °C) (Oral)   Resp 20   Ht 160 cm (5' 3\")   Wt 101.6 kg   LMP 2015   SpO2 97%   BMI 39.68 kg/m²         Physical Exam        Physical Exam  Vitals signs and nursing note reviewed.   General:   Well-appearing woman sitting in the bed in no acute distress  Head: Normocephalic and atraumatic.   HEENT:  Mucous membranes are moist.  TMs with mild erythema bilaterally.  Boggy nasal mucosa bilaterally.  Oropharynx is clear.  Cardiovascular:  Normal rate and regular rhythm.  No Edema  Pulmonary:  Pulmonary effort is normal.  Normal breath sounds. No wheezing, rhonchi or rales.   Abdominal: Soft nontender nondistended, normal bowel sounds, no guarding no rebound tenderness  Skin: Warm and dry  Neurological: Awake alert, speech is normal, motor 5/5 in bilateral upper and lower extremities.  sensation is grossly intact throughout.  No facial asymmetry.  Stable narrow based gait.        ED Course     Labs Reviewed   COMP METABOLIC PANEL (14) - Abnormal; Notable for the following components:       Result Value    Glucose 239 (*)     Calculated  Osmolality 296 (*)     Albumin 3.3 (*)     A/G Ratio 0.8 (*)     All other components within normal limits   PROTHROMBIN TIME (PT) - Normal       Disposition and Plan     Clinical Impression:  1. Intracranial mass    2. Acute maxillary sinusitis, recurrence not specified         Disposition:  Discharge  1/16/2024  2:39 pm    Follow-up:  Jolene Ocampo MD  [address]  [address]  [place] [address]  [phone]    Signed by Rambo Dorado MD on 1/16/2024  2:39 PM            H&P - H&P Note        H&P signed by Yimi De La Cruz DO at 1/16/2024  9:15 PM       Author: Yimi De La Cruz DO Service: -- Author Type: Physician    Filed: 1/16/2024  9:15 PM Date of Service: 1/16/2024  4:42 PM Status: Addendum    : Yimi De La Cruz DO (Physician)    Related Notes: Original Note by Yimi De La Cruz DO (Physician) filed at 1/16/2024  9:14 PM           [facility] H&P/Consult note       CC:   Chief Complaint   Patient presents with    Abnormal Result        PCP: Jolene Ocampo MD    History of Present Illness: Patient is a 58 year old female with PMH sig for htn, hld, dm2, here for headaches, nausea    She started developing neck pain couple months ago, thought to be msk as was worse when she touches the area. For the past week has had worsening R sided facial / neck pain, headaches, nausea. Went to urgent care, found to have sinusitis and possible brain mass, sent to ER  In ER noted to have cavernoma and venous sinus thrombosis. Started on heparin drip  She denied any hx of easy bleeding, no hx of ulcers    OBJECTIVE:  BP (!) 164/83   Pulse 85   Temp 98.2 °F (36.8 °C) (Oral)   Resp 18   Ht 5' 3\" (1.6 m)   Wt 224 lb (101.6 kg)   LMP 08/08/2015   SpO2 97%   BMI 39.68 kg/m²   General:  Alert, no distress, appears stated age.   Head:  Normocephalic, without obvious abnormality, atraumatic.   Eyes:  Sclera anicteric, No conjunctival pallor, EOMs intact.    Throat: dry   Neck: Supple    Lungs:   Clear to  auscultation bilaterally. Normal effort   Chest wall:  No tenderness or deformity.   Heart:  Regular rate and rhythm    Abdomen:   Soft, NT/ND    Extremities: Atraumatic, no cyanosis or edema.   Skin: No visible rashes or lesions.    Neurologic: Normal strength, no focal deficit appreciated     Diagnostic Data:    CBC/Chem  Recent Labs   Lab 01/16/24  1247   WBC 8.5   HGB 13.2   MCV 82.7   .0   INR 1.03       Recent Labs   Lab 01/16/24  1247      K 4.4      CO2 22.0   BUN 19   CREATSERUM 1.01   *   CA 9.2       Recent Labs   Lab 01/16/24  1247   ALT 24   AST 15   ALB 3.3*          Radiology: MRI BRAIN (W+WO) (CPT=70553)    Result Date: 1/16/2024  PROCEDURE:  MRI BRAIN (W+WO) (CPT=70553)  CONCLUSION:   1. There is suggestion of a cavernoma in the right temporal lobe that measures approximately 1.2 x 0.9 cm.  Direct correlation with prior CT examination be helpful.  2. There is thrombosis of the right transverse and sigmoid sinuses.  There is extension of this thrombus into the proximal right internal jugular vein.  3. No evidence of an acute infarct.  This critical result was discussed with Dr. Dorado at 1512 hours on 1/16/2024. Read back was performed.    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   -acute sinusitis     -HTN  -HLD  -DM2  -morbid obesity, BMI 40    Plan    *neuro / neurosx  -IV heparin   Repeat CT head tomorrow to assure no bleeding  -neuro / nsg following    *sinusitis  -augmentin    *HLD  -statin    *DM 2  -at home on Toujeo 100 qpm and novolog 10 with sliding scale  -start with levemir 80 uints and novolog / iss    Scds  Heparin      Yimi De La Cruz  [facility]  Hospitalist  [phone]  Answering Service: [phone]        Electronically signed by Yimi De La Cruz DO on 1/16/2024  9:15 PM           NEUROCRITICAL CARE   CONSULT NOTE  1-17-24         History of Presenting Illness  58 year old female with PMH of HTN, HLD, DM and asthma who presents with sinus pressure and headache since November, imaging demonstrating right temporal mass so came to the ED for further evaluation.  MRI demonstrated 1 cm right temporal cavernoma but also noted right transverse sigmoid venous sinus thrombosis.  Patient started on heparin drip, no signs of bleeding, no neurologic deficits will transitioned therapeutic Lovenox.  No recent head or neck trauma, denies recent COVID infection or getting COVID-vaccine this past year, no changes in medications.  No personal or family history of clotting disorders.      ITAL SIGNS:   Temp:  [97.3 °F (36.3 °C)-98.2 °F (36.8 °C)] 97.8 °F (36.6 °C)  Pulse:  [68-86] 80  Resp:  [12-24] 24  BP: (109-178)/() 135/63  SpO2:  [93 %-100 %] 99 %     INTAKE/OUTPUT:  Intake/Output Summary (Last 24 hours) at 1/17/2024 0907  Last data filed at 1/17/2024 0400      Gross per 24 hour   Intake 280 ml   Output --   Net 280 ml      PHYSICAL EXAM:  GENERAL APPEARANCE: Patient resting comfortably in bed, NAD  HEENT: Normocephalic, atraumatic.   LUNGS: Normal respiratory rate and effort   HEART: Regular rate and rhythm   ABDOMEN: Soft. No tenderness, guarding, or rebound.     NEUROLOGIC:    Mental Status:  A&O x 4, Follows simple commands, no obvious aphasia or dysarthria  Cranial nerves: PERRL.  Visual fields full.  EOMI.  Face symmetric with normal movement bilaterally.  Hearing grossly intact. Tongue midline with normal movements.   Motor: Drift:  Absent; Motor exam is 5 out of 5 in all extremities bilaterally  Sensation: Intact to light touch bilaterally  Cerebellar: Normal Finger-To-Nose test     LABORATORY DATA:  Last 24 hour labs were reviewed in detail.       Recent Labs   Lab 01/17/24  0424      K 4.0      CO2 26.0   *   BUN 20   CREATSERUM 0.89          Recent Labs   Lab 01/17/24 0424   WBC 6.8   HGB 12.1   .0       ASSESSMENT/PLAN   58 year old female with PMH of HTN, HLD, DM and asthma who presents R transverse sigmoid venous sinus thrombosis     Neurological:  Right transverse/sigmoid venous sinus thrombosis         - CT without bleed, and no neurologic deficit at this time         - Continue therapeutic Lovenox         - Repeat CT after second dose to eval for stability          - No obvious etiology noted per history           - MRA w/ and w/o to eval for fistula per nsg          - PT/OT evals     Right temporal cavernoma         - Incidentally noted on outpatient imaging, confirmed on MRI.         - Nsg aware     Cardiovascular:  HTN/HLD         - SBP goal as noted above          - Continue home medications         - IV labetalol/hydralazine pushes prn            Pulmonary:  Asthma/ALEXY  - Satting well on RA, nebs prn, CPAP at night     Renal: - Monitor I&Os       Gastrointestinal:  Nutrition:        - Diabetic diet         - Bowel regimen: ordered prn     Infectious Disease:   Sinusitis - Noted on MRI, on abx per IM, afebrile, continue to monitor      Heme/Onc - Hb goal > 7, continue to monitor daily      Endocrine:  DM Type II, uncontrolled          - Hold PO Meds in the hospital         - HbA1c 11.9         - Accuchecks q6 with SSI prn      Checklist         - Lines: PIVs         - DVT Prophylaxis: SCDs and Lovenox therapeutic          - Diet: ADAT         - IVF: -         - Electrolytes: Replete per protocol         - Code Status: FULL      Dispo: CNICU pending stability scan          MEDICATIONS ADMINISTERED IN LAST 1 DAY:    ampicillin-sulbactam (Unasyn) 3 g in sodium chloride 0.9% 100mL IVPB-ADD  Dose: 3 g  Freq: Once Route: IV  Last Dose: Stopped (01/16/24 1634)  Start: 01/16/24 1600 End: 01/16/24 1634   heparin (Porcine) 02590  units/250mL infusion CONTINUOUS  Rate: 2-30 mL/hr Dose: 200-3000 Units/hr  Freq: Continuous Route: IV  Last Dose: Stopped (01/16/24 2327)  Start: 01/17/24 0102 End: 01/16/24 2301 01/17/24 0400 97.8 °F (36.6 °C) 74 13 132/75 94 % -- None (Room air) -- SC   01/17/24 0300 -- 73 14 133/75 95 % -- None (Room air) -- SC   01/17/24 0200 -- 69 13 123/76 93 % -- None (Room air) -- SC   01/17/24 0100 -- 75 12 125/82 97 % -- None (Room air) -- SC   01/17/24 0000 98 °F (36.7 °C) 80 16 146/77 94 % -- None (Room air) -- SC   01/16/24 2300 -- 72 15 157/90 97 % -- None (Room air) -- SC   01/16/24 2200 -- 86 20 151/68 100 % -- None (Room air) -- SC   01/16/24 2125 97.3 °F (36.3 °C) 85 15 151/83 99 % 223 lb 8.7 oz None (Room air) -- SC

## 2024-01-19 NOTE — PAYOR COMM NOTE
--------------  CONTINUED STAY REVIEW    Payor: DASHAWN REYES POS/MCNP  Subscriber #:  [id number]  Authorization Number: [id number]    Admit date: 1/16/24  Admit time:  9:17 PM    Admitting Physician: Jm Adan MD  Attending Physician:  Yimi De La Cruz DO  Primary Care Physician: Jolene Ocampo MD    REVIEW DOCUMENTATION:        1-17-24  MRV HEAD     CONCLUSION:  Right transverse and sigmoid sinus thrombosis with extension into the internal jugular vein similar to the prior brain MRI study with contrast.       PROCEDURE:  MRA, HEAD   CONCLUSION:    1. Unremarkable MRA [id number] noting variant anatomy in the distal right vertebral artery.   2. Thrombosis of the right transverse and sigmoid sinuses extending to the internal jugular vein is again seen.       CT CAN OF THE HEAD       CONCLUSION:       1. There is a small parenchymal hematoma in the right temporal lobe with mild surrounding edema.  The hematoma has a reference measurement of approximately 10 x 8 x 5 mm.  This area has a gross reference measurement of 10 x 7 mm on the previous MRI.  An   underlying mass or vascular malformation are not entirely excluded.  Clinical correlation recommended.  Follow-up MRI of the brain with without contrast in 2-3 months is suggested to assess for an underlying lesion.      2. Minimal chronic microvascular ischemic changes in the cerebral white matter.  No evidence of acute territorial infarction.      3. Asymmetric hyperdensity of the right sigmoid sinus, right jugular vein, and right transverse sinus due to known venous sinus thrombosis.         1-18-24    PROCEDURE:  CT BRAIN OR HEAD   FINDINGS:       9 x 7 x 3 mm hemorrhage in right temporal was 10 x 8 x 4 mm.     Previously noted high density in right transverse, sigmoid sinus internal jugular vein related to known thrombus is again noted.     Lucencies matter are noted.     Paranasal sinuses mastoid air cells and orbits are.     No significant mass  effect.          Impression   CONCLUSION:  Minimal reduction in size of right temporal lobe hemorrhage.            1-18-24      NEUROLOGIC:    Mental Status:  A&O x 4, Follows simple commands, no obvious aphasia or dysarthria  Cranial nerves: PERRL.  Visual fields full.  EOMI.  Face symmetric with normal movement bilaterally.  Hearing grossly intact. Tongue midline with normal movements.   Motor: Drift:  Absent; Motor exam is 5 out of 5 in all extremities bilaterally  Sensation: Intact to light touch bilaterally  Cerebellar: Normal Finger-To-Nose test      ASSESSMENT/PLAN   58 year old female  with PMH of HTN, HLD, DM and asthma who presents R transverse sigmoid venous sinus thrombosis     Neurosurgery Progress Note     Abnormal findings on CT  Headaches    ital Signs:  Blood pressure 134/62, pulse 76, temperature 98 °F (36.7 °C), temperature source Temporal, resp. rate 25, height 63\", weight 224 lb 13.9 oz (102 kg), last menstrual period 08/08/2015, SpO2 95%, not currently breastfeeding.  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 Heme consulted for recs for home  Repeat brain imaging per neuro / neurosx has been stable  -neuro / nsg following     *sinusitis  -augmentin     *HLD  -statin     *DM  2  -at home on Toujeo 100 qpm and novolog 10 with sliding scale  -start with levemir 80 uints and novolog / iss - controlled for now     Scds  lovenox             MEDICATIONS ADMINISTERED IN LAST 1 DAY:  amoxicillin clavulanate (Augmentin) 875-125 MG per tab 875 mg       Date Action Dose Route User    1/18/2024 2106 Given 875 mg Oral Yamileth Adams RN    1/18/2024 0835 Given 875 mg Oral Lenore Jamison RN          cetirizine (ZyrTEC) tab 10 mg       Date Action Dose Route User    1/18/2024 1252 Given 10 mg Oral Lenore Jamison RN          enoxaparin (Lovenox) 100 MG/ML SUBQ injection 100 mg       Date Action Dose Route User    1/18/2024 2107 Given 100 mg Subcutaneous (Left Lower Abdomen) Yamileth Adams RN    1/18/2024 0835 Given 100 mg Subcutaneous (Left Lower Abdomen) Lenore Jamison RN          ezetimibe (Zetia) tab 10 mg       Date Action Dose Route User    1/18/2024 2106 Given 10 mg Oral Yamileth Adams RN          fluticasone propionate (Flonase) 50 MCG/ACT nasal suspension 1 spray       Date Action Dose Route User    1/18/2024 0836 Given 1 spray Each Nare Lenore Jamison RN          HYDROcodone-acetaminophen (Norco) 5-325 MG per tab 1 tablet       Date Action Dose Route User    1/19/2024 0603 Given 1 tablet Oral Shirley Brandon RN    1/18/2024 1646 Given by Other 1 tablet Oral Lenore Jamison RN          HYDROcodone-acetaminophen (Norco) 5-325 MG per tab 2 tablet       Date Action Dose Route User    1/18/2024 2112 Given 2 tablet Oral Yamileth Adams RN          insulin aspart (NovoLOG) 100 Units/mL FlexPen 10 Units       Date Action Dose Route User    1/18/2024 1859 Given 10 Units Subcutaneous (Left Lower Abdomen) Lenore Jamison RN    1/18/2024 1246 Given 10 Units Subcutaneous (Left Lower Abdomen) Lenore Jamison RN    1/18/2024 0838 Given 10 Units Subcutaneous (Left Lower Abdomen) Shaheen, Lenore, RN          insulin aspart (NovoLOG) 100 Units/mL FlexPen 2-10 Units       Date Action Dose Route  User    1/18/2024 2106 Given 3 Units Subcutaneous (Left Lower Abdomen) Yamileth Adams RN    1/18/2024 1245 Given 2 Units Subcutaneous (Left Lower Abdomen) Lenore Jamison RN          insulin detemir (Levemir) 100 UNIT/ML FlexPen/FlexTouch 80 Units       Date Action Dose Route User    1/18/2024 2355 Given 80 Units Subcutaneous (Left Lower Abdomen) Katy Ndiaye RN          magnesium oxide (Mag-Ox) tab 400 mg       Date Action Dose Route User    1/18/2024 0834 Given 400 mg Oral Lenore Jamison RN          metoprolol succinate ER (Toprol XL) 24 hr tab 25 mg       Date Action Dose Route User    1/19/2024 0553 Given 25 mg Oral Shirley Brandon RN          pantoprazole (Protonix) DR tab 40 mg       Date Action Dose Route User    1/19/2024 0553 Given 40 mg Oral Shirley Brandon RN          rosuvastatin (Crestor) tab 10 mg       Date Action Dose Route User    1/18/2024 2106 Given 10 mg Oral Yamileth Adams RN          simethicone (Mylicon) chewable tab 80 mg       Date Action Dose Route User    1/18/2024 1011 Given 80 mg Oral Lenore Jamison RN          spironolactone (Aldactone) tab 50 mg       Date Action Dose Route User    1/18/2024 0834 Given 50 mg Oral Lenore Jamison RN            Vitals (last day)       Date/Time Temp Pulse Resp BP SpO2 Weight O2 Device O2 Flow Rate (L/min) [facility]    01/19/24 0400 98.6 °F (37 °C) 76 18 124/60 93 % -- None (Room air) --     01/18/24 0400 97.6 °F (36.4 °C) 73 14 139/79 95 % -- None (Room air) -- SC    01/18/24 0300 -- 84 21 143/73 94 % -- None (Room air) -- SC    01/18/24 0200 -- 94 28 138/60 96 % -- None (Room air) -- SC    01/18/24 0100 -- 88 11 132/87 98 % -- None (Room air) -- SC    01/18/24 0000 97.7 °F (36.5 °C) 84 28 130/63 97 % -- None (Room air) -- SC

## 2024-01-19 NOTE — PLAN OF CARE
Assumed care at approx 0730.  Alert and oriented x4. No new neuro deficits.  On room air, respirations even and unlabored.  NSR on tele.  Denies pain.  Safety precautions maintained, patient reports needs met, call light within reach.      Problem: NEUROLOGICAL - ADULT  Goal: Achieves stable or improved neurological status  Description: INTERVENTIONS  - Assess for and report changes in neurological status  - Initiate measures to prevent increased intracranial pressure  - Maintain blood pressure and fluid volume within ordered parameters to optimize cerebral perfusion and minimize risk of hemorrhage  - Monitor temperature, glucose, and sodium. Initiate appropriate interventions as ordered  Outcome: Progressing  Goal: Absence of seizures  Description: INTERVENTIONS  - Monitor for seizure activity  - Administer anti-seizure medications as ordered  - Monitor neurological status  Outcome: Progressing  Goal: Remains free of injury related to seizure activity  Description: INTERVENTIONS:  - Maintain airway, patient safety  and administer oxygen as ordered  - Monitor patient for seizure activity, document and report duration and description of seizure to MD/LIP  - If seizure occurs, turn patient to side and suction secretions as needed  - Reorient patient post seizure  - Seizure pads on all 4 side rails  - Instruct patient/family to notify RN of any seizure activity  - Instruct patient/family to call for assistance with activity based on assessment  Outcome: Progressing  Goal: Achieves maximal functionality and self care  Description: INTERVENTIONS  - Monitor swallowing and airway patency with patient fatigue and changes in neurological status  - Encourage and assist patient to increase activity and self care with guidance from PT/OT  - Encourage visually impaired, hearing impaired and aphasic patients to use assistive/communication devices  Outcome: Progressing

## 2024-01-19 NOTE — PLAN OF CARE
VSS, neuro intact. No deficits noted. Repeat CTH done this am. Pt up and walking with standby assist. C/O intermittent headache and right ear ache, given PRN pain meds and re-started on allergy med. No BM, but passing gas. Eating well at meals. Adequate UOP. Hematology consulted for oral anticoagulation options. Potential discharge tomorrow. Awaiting telemetry bed. Pt updated on POC.

## 2024-01-19 NOTE — PLAN OF CARE
Assumed care at 1930.  AOx4. Neuro check Q4 hours. Neuro intact.  C/o right head/ear pain; Norco given for pain management.  Up to bathroom with minimal assist. Ambulates with steady gait.  Continue to Lovenox.  Hematology to see.  Plan of care discussed with pt.    0115 Pt transferred to  7612. Report given to receiving RN. All belongings with pt. Pt transferred with stable condition.

## 2024-01-19 NOTE — CONSULTS
[facility]  Report of Consultation    Karen Mtz Patient Status:  Inpatient    1965 MRN [id number]   Location [facility] 7NE-A Attending Yimi De La Cruz, DO   Hosp Day # 3 PCP Jolene Ocampo MD     Reason for Consultation:  Venous Sinus Thrombosis    History of Present Illness:  This is a 58 year old with a history of HTN, HLD, DM II, and asthma who presented with headaches found to have venous sinus thrombosis.     Per chart review, the patient had headaches and sinus pressure since 2023 and then came to the ED for further evaluation with MRI Brain that showed right transverse sigmoid venous sinus thrombosis. She was placed on heparin gtt, and converted to Lovenox. She had repeat CT imaging that showed that it was stable, feels like her headaches are better, no history of VTE.     History:  Past Medical History:   Diagnosis Date    Anemia     Asthma     DIABETES     Diabetes mellitus type II, uncontrolled     Disorder of liver     FATTY LIVER    Diverticulosis     Esophageal reflux     Extrinsic asthma, unspecified     ALLERGY INDUCED    High blood pressure     High cholesterol     HYPERLIPIDEMIA     HYPERTRIGLYCERIDEMIA     Hypertriglyceridemia     Neuropathy     ALEXY (obstructive sleep apnea) 2020 HST    AHI 30 Supine AHI 55 non-supine AHI 24 Sao2 [place] 79%    Other and unspecified hyperlipidemia     Sleep apnea     Type II or unspecified type diabetes mellitus without mention of complication, not stated as uncontrolled     Unspecified essential hypertension      Past Surgical History:   Procedure Laterality Date          COLONOSCOPY      COLONOSCOPY,DIAGNOSTIC N/A 10/10/2015    Procedure: ESOPHAGOGASTRODUODENOSCOPY, COLONOSCOPY, POSSIBLE BIOPSY, POSSIBLE POLYPECTOMY 18338, 65074;  Surgeon: Ishan Fournier MD;  Location: [facility]    LUMPECTOMY RIGHT Right 2023    OTHER SURGICAL HISTORY   [facility]    ENDOSCOPY X'S 3    UPPER GI  ENDOSCOPY,DIAGNOSIS N/A 10/10/2015    Procedure: ESOPHAGOGASTRODUODENOSCOPY, COLONOSCOPY, POSSIBLE BIOPSY, POSSIBLE POLYPECTOMY 30710, 22727;  Surgeon: Ishan Fournier MD;  Location: [facility], [facility]     Family History   Problem Relation Age of Onset    Cancer Father         Lung cancer    Lung Disorder Father         COPD    Hypertension Mother     High Cholesterol Mother       reports that she has never smoked. She has never used smokeless tobacco. She reports that she does not drink alcohol and does not use drugs.    Allergies:  Allergies   Allergen Reactions    Ibuprofen HIVES    Lisinopril ANAPHYLAXIS    Sulfa Antibiotics HIVES    Aspirin OTHER (SEE COMMENTS)     At age 14, had rash, swelling and diarrhea with Excedrin        Medications:    Current Facility-Administered Medications:     HYDROcodone-acetaminophen (Norco) 5-325 MG per tab 0.5-1 tablet, 0.5-1 tablet, Oral, Q6H PRN **OR** [DISCONTINUED] HYDROcodone-acetaminophen (Norco) 5-325 MG per tab 2 tablet, 2 tablet, Oral, Q4H PRN    simethicone (Mylicon) chewable tab 80 mg, 80 mg, Oral, QID PRN    cetirizine (ZyrTEC) tab 10 mg, 10 mg, Oral, Daily    albuterol (Ventolin HFA) 108 (90 Base) MCG/ACT inhaler 2 puff, 2 puff, Inhalation, Q6H PRN    fluticasone propionate (Flonase) 50 MCG/ACT nasal suspension 1 spray, 1 spray, Each Nare, Daily    sodium chloride (Saline Mist) 0.65 % nasal solution 1 spray, 1 spray, Each Nare, Q3H PRN    amoxicillin clavulanate (Augmentin) 875-125 MG per tab 875 mg, 875 mg, Oral, Q12H    spironolactone (Aldactone) tab 50 mg, 50 mg, Oral, Daily    rosuvastatin (Crestor) tab 10 mg, 10 mg, Oral, Nightly    pantoprazole (Protonix) DR tab 40 mg, 40 mg, Oral, QAM AC    metoprolol succinate ER (Toprol XL) 24 hr tab 25 mg, 25 mg, Oral, Daily Beta Blocker    insulin detemir (Levemir) 100 UNIT/ML FlexPen/FlexTouch 80 Units, 80 Units, Subcutaneous, Nightly    insulin aspart (NovoLOG) 100 Units/mL FlexPen 10 Units, 10 Units,  Subcutaneous, TID CC    ezetimibe (Zetia) tab 10 mg, 10 mg, Oral, Nightly    acetaminophen (Tylenol Extra Strength) tab 500 mg, 500 mg, Oral, Q4H PRN    ondansetron (Zofran) 4 MG/2ML injection 4 mg, 4 mg, Intravenous, Q6H PRN    prochlorperazine (Compazine) 10 MG/2ML injection 5 mg, 5 mg, Intravenous, Q8H PRN    glucose (Dex4) 15 GM/59ML oral liquid 15 g, 15 g, Oral, Q15 Min PRN **OR** glucose (Glutose) 40% oral gel 15 g, 15 g, Oral, Q15 Min PRN **OR** glucose-vitamin C (Dex-4) chewable tab 4 tablet, 4 tablet, Oral, Q15 Min PRN **OR** dextrose 50% injection 50 mL, 50 mL, Intravenous, Q15 Min PRN **OR** glucose (Dex4) 15 GM/59ML oral liquid 30 g, 30 g, Oral, Q15 Min PRN **OR** glucose (Glutose) 40% oral gel 30 g, 30 g, Oral, Q15 Min PRN **OR** glucose-vitamin C (Dex-4) chewable tab 8 tablet, 8 tablet, Oral, Q15 Min PRN    insulin aspart (NovoLOG) 100 Units/mL FlexPen 2-10 Units, 2-10 Units, Subcutaneous, TID AC and HS    enoxaparin (Lovenox) 100 MG/ML SUBQ injection 100 mg, 1 mg/kg, Subcutaneous, 2 times per day    hydrALAzine (Apresoline) 20 mg/mL injection 10 mg, 10 mg, Intravenous, Q4H PRN    Review of Systems:  A 10-point review of systems was done with pertinent positives and negatives per the HPI.    Physical Exam:   Blood pressure 132/57, pulse 82, temperature 97.7 °F (36.5 °C), temperature source Oral, resp. rate 18, height 5' 3\" (1.6 m), weight 224 lb 13.9 oz (102 kg), last menstrual period 08/08/2015, SpO2 95%, not currently breastfeeding.  General: Patient is alert and oriented x 3, not in acute distress.  Vital Signs: /57 (BP Location: Right arm)   Pulse 82   Temp 97.7 °F (36.5 °C) (Oral)   Resp 18   Ht 5' 3\" (1.6 m)   Wt 224 lb 13.9 oz (102 kg)   LMP 08/08/2015   SpO2 95%   BMI 39.83 kg/m²   HEENT: EOMs intact. PERRL. Oropharynx is clear.   Neck: No palpable lymphadenopathy. Neck is supple.  Chest: Clear to auscultation.  Heart: Regular rate and rhythm.   Abdomen: Soft, non tender with good  bowel sounds.  Extremities: Pedal pulses are present. No edema.  Neurological: Grossly intact.   Lymphatics: There is no palpable lymphadenopathy throughout in the cervical or supraclavicular, regions.    Laboratory Data:    Lab Results   Component Value Date    WBC 8.4 01/19/2024    HGB 14.2 01/19/2024    HCT 44.0 01/19/2024    .0 01/19/2024    CREATSERUM 0.94 01/19/2024    BUN 19 01/19/2024     01/19/2024    K 3.9 01/19/2024     01/19/2024    CO2 26.0 01/19/2024     01/19/2024    CA 8.9 01/19/2024    MG 1.7 01/19/2024    PGLU 109 01/19/2024       Imaging:  MRI Brain: 1. There is suggestion of a cavernoma in the right temporal lobe that measures approximately 1.2 x 0.9 cm.  Direct correlation with prior CT examination be helpful.      2. There is thrombosis of the right transverse and sigmoid sinuses.  There is extension of this thrombus into the proximal right internal jugular vein.      3. No evidence of an acute infarct.     Impression and Plan:    This is a 58 year old with a history of HTN, HLD, DM II, and asthma who presented with headaches found to have venous sinus thrombosis.     Venous Sinus Thrombosis: History as above; In brief, the patient had headaches and sinus pressure since 11/2023 and then came to the ED for further evaluation with MRI Brain that showed right transverse sigmoid venous sinus thrombosis. She was placed on heparin gtt, and converted to Lovenox. She had repeat CT imaging that showed that it was stable, feels like her headaches are better, no history of VTE.   - unclear etiology at this time  - will continue with anticoagulation and will place prescription for Xarelto starter pack on discharge  - will follow-up as outpatient in [place] with me and plan on 6 months of anticoagulation with repeat imaging    I will continue to follow while inpatient. Please do not hesitate to contact me directly with any specific questions or concerns.    Nilesh Soto MD  [place]  [facility]  Department of Oncology and Hematology  1/19/2024  10:09 AM

## 2024-01-19 NOTE — DISCHARGE SUMMARY
[facility] Hospitalist Discharge Summary    Patient ID  Karen Mtz  [id number]  58 year old  2/6/1965    Admit date: 1/16/2024    Discharge date: 01/19/24    Attending: Yimi De La Cruz DO     Primary Care Physician: Jolene Ocampo MD      Reason for admission: headache    Discharge condition: stable    Disposition: home     Important follow up:  -PCP within 7 d  -specialists: NSG as directed - has appt 02/01    -labs:    -radiology:      Additional patient instructions       Discharge med list     Medication List        START taking these medications      amoxicillin clavulanate 875-125 MG Tabs  Commonly known as: Augmentin  Take 1 tablet by mouth 2 (two) times daily for 10 days.     rivaroxaban 15 & 20 MG Tbpk  Take As Directed based on package instructions: Days 1-21: 15 mg by mouth twice daily Days 22-30: 20 mg by mouth once daily            CONTINUE taking these medications      FreeStyle Nick 14 Day [place] Luz  1 Units as needed.     FreeStyle Nick 14 Day Sensor Misc  1 each by Does not apply route every 14 (fourteen) days.     Insulin Pen Needle 32G X 4 MM Misc  Commonly known as: NovoFine Plus  Use 4 per day            ASK your doctor about these medications      albuterol 108 (90 Base) MCG/ACT Aers  Commonly known as: ProAir HFA  Inhale 2 puffs into the lungs every 6 (six) hours as needed for Wheezing or Shortness of Breath.     cetirizine 10 MG Tabs  Commonly known as: ZyrTEC     clotrimazole-betamethasone 1-0.05 % Crea  Commonly known as: Lotrisone  APPLY TO AFFECTED AREA DAILY     ergocalciferol 1.25 MG (13382 UT) Caps  Commonly known as: Vitamin D2  Take 1 capsule (50,000 Units total) by mouth once a week.     estradiol 0.1 MG/GM Crea  Commonly known as: Estrace  Use 1 g nightly x 2 wks, then use 1 g twice a week     ezetimibe 10 MG Tabs  Commonly known as: Zetia  Take 1 tablet (10 mg total) by mouth daily.     famotidine 20 MG Tabs  Commonly known as: Pepcid     fenofibrate 145 MG Tabs  Commonly  known as: Tricor  TAKE ONE TABLET BY MOUTH ONCE DAILY     fexofenadine 180 MG Tabs  Commonly known as: Allegra     fluticasone propionate 50 MCG/ACT Aepb  Commonly known as: Flovent Diskus     furosemide 20 MG Tabs  Commonly known as: Lasix  Take 1 tablet (20 mg total) by mouth daily.     glimepiride 4 MG Tabs  Commonly known as: Amaryl     Glucose Blood Strp  Commonly known as: Tito Contour Next Test  Test 4 times daily     Magnesium 500 MG Tabs     metoprolol succinate ER 25 MG Tb24  Commonly known as: Toprol XL     NovoLOG FlexPen 100 UNIT/ML Sopn  Generic drug: insulin aspart  Take 10 units with dinner     Nystatin Powd     Omeprazole 40 MG Cpdr  TAKE ONE CAPSULE BY MOUTH ONCE DAILY     ondansetron 4 MG Tbdp  Commonly known as: Zofran-ODT  Take 1 tablet (4 mg total) by mouth every 4 (four) hours as needed for Nausea.     rosuvastatin 10 MG Tabs  Commonly known as: Crestor     spironolactone 50 MG Tabs  Commonly known as: Aldactone  Take 1 tablet (50 mg total) by mouth daily.     topiramate 100 MG Tabs  Commonly known as: TopaMAX  Take 1 tablet (100 mg total) by mouth 2 (two) times daily.     Toujeo Max SoloStar 300 UNIT/ML Sopn  Generic drug: Insulin Glargine (2 Unit Dial)  Inject 100 Units into the skin daily.     VASCEPA OR     XYZAL OR               Where to Get Your Medications        These medications were sent to [facility] [address], [facility] [phone], [phone]  [address] [place] [address]      Phone: [phone]   rivaroxaban 15 & 20 MG Tbpk       These medications were sent to [id number] - [place], [place] - [address] [place] [address] [phone], [phone]  [address] [place] [id number]      Hours: 24-hours Phone: [phone]   amoxicillin clavulanate 875-125 MG Tabs         Discharge Diagnoses:    -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  INDICATIONS:  fu for stability  TECHNIQUE:  Noncontrast CT scanning is performed through the brain. Dose reduction techniques were used. Dose information is transmitted to the ACR (American College of Radiology) NRDR (National Radiology Data Registry) which includes the Dose Index Registry.  PATIENT STATED HISTORY: (As transcribed by Technologist)  Patient is here for a follow up for stability.    FINDINGS:   9 x 7 x 3 mm hemorrhage in right temporal was 10 x 8 x 4 mm.  Previously noted high density in right transverse, sigmoid sinus internal jugular vein related to known thrombus is again noted.  Lucencies matter are noted.  Paranasal sinuses mastoid air cells and orbits are.  No significant mass effect.              CONCLUSION:  Minimal reduction in size of right temporal lobe hemorrhage.    LOCATION:  [facility]   Dictated by (CST): Kwesi Viveros MD on 1/18/2024 at 9:42 AM     Finalized by (CST): Kwesi Viveros MD on 1/18/2024 at 9:47 AM       MRV HEAD (W+WO)(CMU=19230)    Result Date: 1/18/2024  PROCEDURE:  MRV HEAD (W+WO)(CPT=70546)  COMPARISON:  [facility] , CT, CT BRAIN OR HEAD ([id number]), 1/17/2024, 12:27 PM.  [facility] , MR, MRI BRAIN (W+WO) (CPT=70553), 1/16/2024, 2:10 PM.  INDICATIONS:  venous sinus thrombosis  TECHNIQUE:  MR angiography of the brain without infusion was performed using 3D time of flight, multi-planar and 3D reconstructed images.  All measurements obtained in this exam were performed using NASCET criteria.  PATIENT STATED HISTORY:(As transcribed by Technologist)  Patient complains of headache, right-sided facial and neck pain, and nausea for the past one week.   CONTRAST  USED:  20 mL of Dotarem  FINDINGS:  Flow related signal is seen in the superior sagittal sinus, straight sinus, left transverse and sigmoid sinuses as well as grade cerebral veins.  The inferior sagittal sinus appears to be a diminutive structure with no thrombosis.    No flow related signal is seen in the right transverse and sigmoid sinus consistent with thrombosis extending into the internal jugular vein.  This was seen on the prior brain MRI study with contrast.             CONCLUSION:  Right transverse and sigmoid sinus thrombosis with extension into the internal jugular vein similar to the prior brain MRI study with contrast.   LOCATION:  [facility]   Dictated by (CST): Jesus Sahni MD on 1/18/2024 at 0:05 AM     Finalized by (CST): Jesus Sahni MD on 1/18/2024 at 0:10 AM       MRA, HEAD (W+WO) (CPT=70546)    Result Date: 1/18/2024  PROCEDURE:  MRA, HEAD (W+WO) (CPT=70546)  COMPARISON:  [facility] , MR, MRI BRAIN (W+WO) (CPT=70553), 1/16/2024, 2:10 PM.  INDICATIONS:  Venous sinus thrombosis  TECHNIQUE:  MR angiography was performed in the usual manner with and without contrast. Multiplanar reconstructed 2D and 3D images of the cerebral arteries, including MIP imaging, were created and interpreted.  All measurements obtained in this exam were  performed using NASCET criteria.  PATIENT STATED HISTORY:(As transcribed by Technologist)  Patient complains of headache, right-sided facial and neck pain, and nausea for the past one week.   CONTRAST USED:  20 mL of Dotarem  FINDINGS:  Anterior, middle and posterior cerebral arteries are patent with smooth tapering.  No intracranial aneurysm or arterial venous malformation.    There is a fetal type right posterior communicating artery.  The distal right vertebral artery is very small in caliber, terminating in posterior inferior cerebral artery branch and not joining the basilar artery, anatomic variation.    No flow is seen in the right transverse, sigmoid sinuses and proximal  internal jugular vein similar to the recent MRI brain study with contrast consistent with thrombosis.             CONCLUSION:  1. Unremarkable MRA [id number] noting variant anatomy in the distal right vertebral artery. 2. Thrombosis of the right transverse and sigmoid sinuses extending to the internal jugular vein is again seen.    LOCATION:  [facility]   Dictated by (CST): Jesus Sahni MD on 1/17/2024 at 11:53 PM     Finalized by (CST): Jesus Sahni MD on 1/18/2024 at 0:01 AM       CT BRAIN OR HEAD ([id number])    Result Date: 1/17/2024  PROCEDURE:  CT BRAIN OR HEAD ([id number])  COMPARISON:  [facility] , MR, MRI BRAIN (W+WO) (CPT=70553), 1/16/2024, 2:10 PM.  INDICATIONS:  ro bleed on AC for VST  TECHNIQUE:  Noncontrast CT scanning is performed through the brain. Dose reduction techniques were used. Dose information is transmitted to the ACR (American College of Radiology) NRDR (National Radiology Data Registry) which includes the Dose Index Registry.  PATIENT STATED HISTORY: (As transcribed by Technologist)  ro bleed on AC for VST   FINDINGS: Ventricles and sulci are within normal limits.  There is no midline shift.  The basal cisterns are patent.  The gray-white matter differentiation is intact.  There is a small parenchymal hematoma in the right temporal lobe with mild surrounding edema.  The hematoma has a reference measurement of approximately 10 x 8 x 5 mm. This area has a gross reference measurement of 10 x 7 mm on the previous MRI. An underlying mass or vascular malformation are not entirely excluded.  Clinical correlation recommended.  Follow-up MRI of the brain with without contrast in 2-3 months is suggested to assess for an underlying lesion.  Minimal chronic microvascular ischemic changes in the cerebral white matter.  No evidence of acute territorial infarction.  Asymmetric hyperdensity of the right sigmoid sinus, right jugular vein, and right transverse sinus due to known venous sinus thrombosis.  There is no  evident fracture.  Moderate right maxillary mucosal thickening.  mastoid air cells are unremarkable.             CONCLUSION:   1. There is a small parenchymal hematoma in the right temporal lobe with mild surrounding edema.  The hematoma has a reference measurement of approximately 10 x 8 x 5 mm.  This area has a gross reference measurement of 10 x 7 mm on the previous MRI.  An underlying mass or vascular malformation are not entirely excluded.  Clinical correlation recommended.  Follow-up MRI of the brain with without contrast in 2-3 months is suggested to assess for an underlying lesion.  2. Minimal chronic microvascular ischemic changes in the cerebral white matter.  No evidence of acute territorial infarction.  3. Asymmetric hyperdensity of the right sigmoid sinus, right jugular vein, and right transverse sinus due to known venous sinus thrombosis.  Critical results were discussed with the patient's care nurse Danish at 1239 hours on 1/17/2024. Critical results were read back.  LOCATION:  [facility]   Dictated by (CST): Mynor Grace MD on 1/17/2024 at 12:37 PM     Finalized by (CST): Mynor Grace MD on 1/17/2024 at 12:41 PM       MRI BRAIN (W+WO) (CPT=70553)    Result Date: 1/16/2024  PROCEDURE:  MRI BRAIN (W+WO) (CPT=70553)  COMPARISON:  None.  INDICATIONS:  R temportal mass  TECHNIQUE:  MRI of the brain was performed with multi-planar T1, T2-weighted images with FLAIR sequences and diffusion weighted images without and with infusion.  PATIENT STATED HISTORY:(As transcribed by Technologist)  Right sided neck,ear, and headache.   CONTRAST USED:  30 mL of Dotarem  FINDINGS:   Region of blooming artifact with mild FLAIR abnormality along with T2 abnormality in the right temporal lobe is noted.  Low signal on T1 is also noted.  Minimal enhancement extending towards this region is noted.  This is suspicious for a cavernoma.  This measures approximately 1.2 x 0.9 cm.  Associated susceptibility is noted.  Direct  correlation with prior CT examination be helpful.  There is thrombosis of the right transverse sinus, sigmoid sinus along with the very proximal right internal jugular vein.  The left transverse sinus, sigmoid sinus along with straight sinus and superior sagittal sinus are patent.  Internal cerebral veins are patent.  There is no evidence of an acute infarct.  Mild mucoperiosteal thickening of the paranasal sinuses.  Flow voids the [id number] are patent.  Mastoid air cells are overall unremarkable.               CONCLUSION:   1. There is suggestion of a cavernoma in the right temporal lobe that measures approximately 1.2 x 0.9 cm.  Direct correlation with prior CT examination be helpful.  2. There is thrombosis of the right transverse and sigmoid sinuses.  There is extension of this thrombus into the proximal right internal jugular vein.  3. No evidence of an acute infarct.  This critical result was discussed with Dr. Dorado at 1512 hours on 1/16/2024. Read back was performed.    LOCATION:  [facility]    Dictated by (CST): Kwesi Viveros MD on 1/16/2024 at 3:05 PM     Finalized by (CST): Kwesi Viveros MD on 1/16/2024 at 3:12 PM         Operative reports:      Hospital course:    Patient is a 58 year old female with PMH sig for htn, hld, dm2, here for headaches, nausea     She was found to have R temporal cavernoma and R transverse sinus thrombosis   She was started on heparin drip, then lovenox. Will dc on Xarelto   Repeat imaging was neg for worsening findings - had a hyperdense area-unclear if from bleeding or from the cavernoma but did not change with AC. Neurosurgery, neuro and heme saw    Also found to have acute sinusitis - started on augmentin       Day of discharge exam:  Vitals:    01/19/24 1200   BP: 136/64   Pulse: 86   Resp: 19   Temp: 98.1 °F (36.7 °C)     Overall feeling better  Was taking norco but was making her too drowsy      No acute distress, alert and oriented   Lungs clear  Heart  regular  Abdomen benign    Total time coordinating care 35 min      Patient and/or family had opportunity to ask questions and expressed understanding and agreement with therapeutic plan as outlined         Yimi Dumont Hospitalist  [phone]  Answering Service: [phone]

## 2024-01-19 NOTE — PLAN OF CARE
Discharge instructions, patient verbalizes understanding, Xarelto filled via meds to beds.        NURSING DISCHARGE NOTE    Discharged Home via Wheelchair.  Accompanied by Family member and Support staff  Belongings Taken by patient/family.      Problem: NEUROLOGICAL - ADULT  Goal: Achieves stable or improved neurological status  Description: INTERVENTIONS  - Assess for and report changes in neurological status  - Initiate measures to prevent increased intracranial pressure  - Maintain blood pressure and fluid volume within ordered parameters to optimize cerebral perfusion and minimize risk of hemorrhage  - Monitor temperature, glucose, and sodium. Initiate appropriate interventions as ordered  1/19/2024 1611 by Polina Sung RN  Outcome: Adequate for Discharge  1/19/2024 1610 by Polina Sung RN  Outcome: Progressing  Goal: Absence of seizures  Description: INTERVENTIONS  - Monitor for seizure activity  - Administer anti-seizure medications as ordered  - Monitor neurological status  1/19/2024 1611 by Polina Sung RN  Outcome: Adequate for Discharge  1/19/2024 1610 by Polina Sung RN  Outcome: Progressing  Goal: Remains free of injury related to seizure activity  Description: INTERVENTIONS:  - Maintain airway, patient safety  and administer oxygen as ordered  - Monitor patient for seizure activity, document and report duration and description of seizure to MD/LIP  - If seizure occurs, turn patient to side and suction secretions as needed  - Reorient patient post seizure  - Seizure pads on all 4 side rails  - Instruct patient/family to notify RN of any seizure activity  - Instruct patient/family to call for assistance with activity based on assessment  1/19/2024 1611 by Polina Sung RN  Outcome: Adequate for Discharge  1/19/2024 1610 by Polina Sung RN  Outcome: Progressing  Goal: Achieves maximal functionality and self care  Description: INTERVENTIONS  - Monitor swallowing and  airway patency with patient fatigue and changes in neurological status  - Encourage and assist patient to increase activity and self care with guidance from PT/OT  - Encourage visually impaired, hearing impaired and aphasic patients to use assistive/communication devices  1/19/2024 1611 by Polina Sung, RN  Outcome: Adequate for Discharge  1/19/2024 1610 by Polina Sung, RN  Outcome: Progressing

## 2024-01-19 NOTE — PLAN OF CARE
Assumed care of patient at 0124 as transfer from [facility]ICU. Received alert and oriented x4. VSS. Neurologically intact.Complains of a 1/10 headache but denies the need for intervention. Room air with . Ambulatory in room. Patient oriented to room, call light within reach and plan of care updated. Will continue to monitor.

## 2024-01-19 NOTE — CM/SW NOTE
RN asked about Xarelto coverage- script sent to [facility]- co pay is $70.  Pt given free 30 day coupon and $10 copay coupon.    VOLODYMYR MohamudW  /Discharge Planner

## 2024-01-22 ENCOUNTER — PATIENT OUTREACH (OUTPATIENT)
Dept: INTERNAL MEDICINE CLINIC | Facility: CLINIC | Age: 59
End: 2024-01-22

## 2024-01-22 NOTE — PROGRESS NOTES
Called pt to schedule a hospital follow-up appt :    DM Request    No answer, LVM to call back for assistance with scheduling.

## 2024-01-23 NOTE — PROGRESS NOTES
Again, called pt to schedule a hospital follow-up appt :     DM Request    Dr. Wilda Alvarado's  office will call pt, due to no availability in needed time frame    Spoke with pt and informed that the MD office will contact her directly to schedule.  Pt verbalized that she understands & no further assistance is needed.    Closing encounter

## 2024-01-24 NOTE — PAYOR COMM NOTE
--------------  CONTINUED STAY REVIEW    Payor: [employer] [place]/MCNP  Subscriber #:  [id number]  Authorization Number: [id number]    Admit date: 1/16/24  Admit time:  9:17 PM    Admitting Physician: Jm Adan MD  Attending Physician:  No att. providers found  Primary Care Physician: Jolene Ocampo MD    REVIEW DOCUMENTATION:    1-18-24     Neurosurgery Progress Note     Respiratory:  nonlabored  CV:  well perfused  General: NAD, Speech Fluent, Mood Appropriate  Neurologic:   A&Ox3  PERRL, EOMi, FS, TM  Full strength x 4, no drift        Labs:      Recent Labs   Lab 01/18/24  0432   RBC 4.52   HGB 12.6   HCT 37.9   MCV 83.8   MCH 27.9   MCHC 33.2   RDW 13.6   NEPRELIM  --    WBC 8.3   .0             Recent Labs   Lab 01/18/24  0432   *   BUN 17   CREATSERUM 0.81   EGFRCR 84   CA 8.9      K 4.3      CO2 26.0     CT BRAIN OR HEAD (26061)     Result Date: 1/18/2024  CONCLUSION:  Minimal reduction in size of right temporal lobe hemorrhage.    LOCATION:  [facility]   Dictated by (CST): Kwesi Viveros MD on 1/18/2024 at 9:42 AM     Finalized by (CST): Kwesi Viveros MD on 1/18/2024 at 9:47 AM        MRV HEAD (W+WO)(ORP=82759)     Result Date: 1/18/2024  CONCLUSION:  Right transverse and sigmoid sinus thrombosis with extension into the internal jugular vein similar to the prior brain MRI study with contrast.   LOCATION:  [facility]   Dictated by (CST): Jesus Sahni MD on 1/18/2024 at 0:05 AM     Finalized by (CST): Jesus Sahni MD on 1/18/2024 at 0:10 AM        MRA, HEAD (W+WO) (CPT=70546)     Result Date: 1/18/2024  CONCLUSION:  1. Unremarkable MRA [id number]-[id number] noting variant anatomy in the distal right vertebral artery. 2. Thrombosis of the right transverse and sigmoid sinuses extending to the internal jugular vein is again seen.    LOCATION:  [facility]   Dictated by (CST): Jesus Sahni MD on 1/17/2024 at 11:53 PM     Finalized by (CST): Jesus Sahni MD on 1/18/2024 at 0:01  AM    Assessment:  59 yo female with right transverse sinus thrombosis and right temporal cavernoma  Right temporal hemorrhage     Plan:  MRA Brain negative for fistula  Repeat head CT stable   Reviewed previous head CT from [facility] and hyperdense mass is consistent with a cavernoma  Neuro checks  OK to switch to oral AC    Scheduled Meds:    cetirizine  10 mg Oral Daily    fluticasone propionate  1 spray Each Nare Daily    amoxicillin potassium and clavulanate  875 mg Oral Q12H    spironolactone  50 mg Oral Daily    rosuvastatin  10 mg Oral Nightly    pantoprazole  40 mg Oral QAM AC    metoprolol succinate ER  25 mg Oral Daily Beta Blocker    insulin detemir  80 Units Subcutaneous Nightly    insulin aspart  10 Units Subcutaneous TID CC    ezetimibe  10 mg Oral Nightly    insulin aspart  2-10 Units Subcutaneous TID AC and HS    enoxaparin  1 mg/kg Subcutaneous 2 times per day              01/18/24 0500 -- 68 14 140/82 94 % -- None (Room air) -- SC    01/18/24 0400 97.6 °F (36.4 °C) 73 14 139/79 95 % -- None (Room air) -- SC    01/18/24 0300 -- 84 21 143/73 94 % -- None (Room air) -- SC    01/18/24 0200 -- 94 28 138/60 96 % -- None (Room air) -- SC    01/18/24 0100 -- 88 11 132/87 98 % -- None (Room air) -- SC    01/18/24 0000 97.7 °F (36.5 °C) 84 28 130/63 97 % -- None (Room air) -- SC

## 2024-01-24 NOTE — PAYOR COMM NOTE
--------------  DISCHARGE REVIEW    Payor: [employer] [place] POS/MCNP  Subscriber #:  [id number]  Authorization Number: [id number]    Admit date: 1/16/24  Admit time:   9:17 PM  Discharge Date: 1/19/2024  7:17 PM     Admitting Physician: Jm Adan MD  Attending Physician:  No att. providers found  Primary Care Physician: Jolene Ocampo MD          Discharge Summary Notes        Discharge Summary signed by Yimi De La Cruz DO at 1/19/2024  1:44 PM       Author: Yimi De La Cruz DO Specialty: Internal Medicine Author Type: Physician    Filed: 1/19/2024  1:44 PM Date of Service: 1/19/2024  1:35 PM Status: Signed    : Yimi De La Cruz DO (Physician)         [facility] Discharge Summary    Patient ID  Karen Mtz  [id number]  58 year old  2/6/1965    Admit date: 1/16/2024    Discharge date: 01/19/24    Attending: Yimi De La Cruz DO     Primary Care Physician: Jolene Ocampo MD      Reason for admission: headache    Discharge condition: stable    Disposition: home     Important follow up:  -PCP within 7 d  -specialists: [facility] as directed - has appt 02/01    -labs:    -radiology:      Additional patient instructions       Discharge med list     Medication List        START taking these medications      amoxicillin clavulanate 875-125 MG Tabs  Commonly known as: Augmentin  Take 1 tablet by mouth 2 (two) times daily for 10 days.     rivaroxaban 15 & 20 MG Tbpk  Take As Directed based on package instructions: Days 1-21: 15 mg by mouth twice daily Days 22-30: 20 mg by mouth once daily            CONTINUE taking these medications      FreeStyle Nick 14 Day [place] Luz  1 Units as needed.     FreeStyle Nick 14 Day Sensor Misc  1 each by Does not apply route every 14 (fourteen) days.     Insulin Pen Needle 32G X 4 MM Misc  Commonly known as: NovoFine Plus  Use 4 per day            ASK your doctor about these medications      albuterol 108 (90 Base) MCG/ACT Aers  Commonly known as: ProAir HFA  Inhale 2  puffs into the lungs every 6 (six) hours as needed for Wheezing or Shortness of Breath.     cetirizine 10 MG Tabs  Commonly known as: ZyrTEC     clotrimazole-betamethasone 1-0.05 % Crea  Commonly known as: Lotrisone  APPLY TO AFFECTED AREA DAILY     ergocalciferol 1.25 MG (68870 UT) Caps  Commonly known as: Vitamin D2  Take 1 capsule (50,000 Units total) by mouth once a week.     estradiol 0.1 MG/GM Crea  Commonly known as: Estrace  Use 1 g nightly x 2 wks, then use 1 g twice a week     ezetimibe 10 MG Tabs  Commonly known as: Zetia  Take 1 tablet (10 mg total) by mouth daily.     famotidine 20 MG Tabs  Commonly known as: Pepcid     fenofibrate 145 MG Tabs  Commonly known as: Tricor  TAKE ONE TABLET BY MOUTH ONCE DAILY     fexofenadine 180 MG Tabs  Commonly known as: Allegra     fluticasone propionate 50 MCG/ACT Aepb  Commonly known as: Flovent Diskus     furosemide 20 MG Tabs  Commonly known as: Lasix  Take 1 tablet (20 mg total) by mouth daily.     glimepiride 4 MG Tabs  Commonly known as: Amaryl     Glucose Blood Strp  Commonly known as: [employer] Contour Next Test  Test 4 times daily     Magnesium 500 MG Tabs     metoprolol succinate ER 25 MG Tb24  Commonly known as: Toprol XL     NovoLOG FlexPen 100 UNIT/ML Sopn  Generic drug: insulin aspart  Take 10 units with dinner     Nystatin Powd     Omeprazole 40 MG Cpdr  TAKE ONE CAPSULE BY MOUTH ONCE DAILY     ondansetron 4 MG Tbdp  Commonly known as: Zofran-ODT  Take 1 tablet (4 mg total) by mouth every 4 (four) hours as needed for Nausea.     rosuvastatin 10 MG Tabs  Commonly known as: Crestor     spironolactone 50 MG Tabs  Commonly known as: Aldactone  Take 1 tablet (50 mg total) by mouth daily.     topiramate 100 MG Tabs  Commonly known as: TopaMAX  Take 1 tablet (100 mg total) by mouth 2 (two) times daily.     Toujeo Max SoloStar 300 UNIT/ML Sopn  Generic drug: Insulin Glargine (2 Unit Dial)  Inject 100 Units into the skin daily.     VASCEPA OR     XYZAL OR                Where to Get Your Medications        These medications were sent to [facility] - [place], [place] - [address], [address] [phone], [phone]  [address], [address], [facility] [address]      Phone: [phone]   rivaroxaban 15 & 20 MG Tbpk       These medications were sent to [id number] - [place], [place] - [address] [phone], [phone]  [address], [facility] [address]      Hours: 24-hours Phone: [phone]   amoxicillin clavulanate 875-125 MG Tabs         Discharge Diagnoses:    -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  INDICATIONS:  fu for stability  TECHNIQUE:  Noncontrast CT scanning is performed through the brain. Dose reduction techniques were used. Dose information is transmitted to the ACR (American College of Radiology) NRDR (National Radiology Data Registry) which includes the Dose Index Registry.  PATIENT STATED HISTORY: (As transcribed by Technologist)  Patient is here for a follow up for stability.    FINDINGS:   9 x 7 x 3 mm hemorrhage in right temporal was 10 x 8 x 4 mm.  Previously noted high density in right transverse, sigmoid sinus internal jugular vein related to known thrombus is again noted.  Lucencies matter are noted.  Paranasal sinuses mastoid air cells and orbits are.  No significant mass effect.              CONCLUSION:  Minimal reduction in size of right temporal lobe hemorrhage.    LOCATION:  [facility]   Dictated by (CST): Kwesi Viveros MD on 1/18/2024 at 9:42 AM     Finalized by (CST): Kwesi Viveros MD on  1/18/2024 at 9:47 AM       MRV HEAD (W+WO)(UPP=05648)    Result Date: 1/18/2024  PROCEDURE:  MRV HEAD (W+WO)(CPT=70546)  COMPARISON:  [facility] , CT, CT BRAIN OR HEAD (86543), 1/17/2024, 12:27 PM.  [facility] , MR, MRI BRAIN (W+WO) (CPT=70553), 1/16/2024, 2:10 PM.  INDICATIONS:  venous sinus thrombosis  TECHNIQUE:  MR angiography of the brain without infusion was performed using 3D time of flight, multi-planar and 3D reconstructed images.  All measurements obtained in this exam were performed using NASCET criteria.  PATIENT STATED HISTORY:(As transcribed by Technologist)  Patient complains of headache, right-sided facial and neck pain, and nausea for the past one week.   CONTRAST USED:  20 mL of Dotarem  FINDINGS:  Flow related signal is seen in the superior sagittal sinus, straight sinus, left transverse and sigmoid sinuses as well as grade cerebral veins.  The inferior sagittal sinus appears to be a diminutive structure with no thrombosis.    No flow related signal is seen in the right transverse and sigmoid sinus consistent with thrombosis extending into the internal jugular vein.  This was seen on the prior brain MRI study with contrast.             CONCLUSION:  Right transverse and sigmoid sinus thrombosis with extension into the internal jugular vein similar to the prior brain MRI study with contrast.   LOCATION:  [facility]   Dictated by (CST): Jesus Sahni MD on 1/18/2024 at 0:05 AM     Finalized by (CST): Jesus Sahni MD on 1/18/2024 at 0:10 AM       MRA, HEAD (W+WO) (CPT=70546)    Result Date: 1/18/2024  PROCEDURE:  MRA, HEAD (W+WO) (CPT=70546)  COMPARISON:  [facility] , MR, MRI BRAIN (W+WO) (CPT=70553), 1/16/2024, 2:10 PM.  INDICATIONS:  Venous sinus thrombosis  TECHNIQUE:  MR angiography was performed in the usual manner with and without contrast. Multiplanar reconstructed 2D and 3D images of the cerebral arteries, including MIP imaging, were created and interpreted.  All measurements obtained in this exam were   performed using NASCET criteria.  PATIENT STATED HISTORY:(As transcribed by Technologist)  Patient complains of headache, right-sided facial and neck pain, and nausea for the past one week.   CONTRAST USED:  20 mL of Dotarem  FINDINGS:  Anterior, middle and posterior cerebral arteries are patent with smooth tapering.  No intracranial aneurysm or arterial venous malformation.    There is a fetal type right posterior communicating artery.  The distal right vertebral artery is very small in caliber, terminating in posterior inferior cerebral artery branch and not joining the basilar artery, anatomic variation.    No flow is seen in the right transverse, sigmoid sinuses and proximal internal jugular vein similar to the recent MRI brain study with contrast consistent with thrombosis.             CONCLUSION:  1. Unremarkable MRA [id number] noting variant anatomy in the distal right vertebral artery. 2. Thrombosis of the right transverse and sigmoid sinuses extending to the internal jugular vein is again seen.    LOCATION:  [facility]   Dictated by (CST): Jesus Sahni MD on 1/17/2024 at 11:53 PM     Finalized by (CST): Jesus Sahni MD on 1/18/2024 at 0:01 AM       CT BRAIN OR HEAD (03114)    Result Date: 1/17/2024  PROCEDURE:  CT BRAIN OR HEAD (91393)  COMPARISON:  [facility] , MR, MRI BRAIN (W+WO) (CPT=70553), 1/16/2024, 2:10 PM.  INDICATIONS:  ro bleed on AC for VST  TECHNIQUE:  Noncontrast CT scanning is performed through the brain. Dose reduction techniques were used. Dose information is transmitted to the ACR (American College of Radiology) NRDR (National Radiology Data Registry) which includes the Dose Index Registry.  PATIENT STATED HISTORY: (As transcribed by Technologist)  ro bleed on AC for VST   FINDINGS: Ventricles and sulci are within normal limits.  There is no midline shift.  The basal cisterns are patent.  The gray-white matter differentiation is intact.  There is a small parenchymal hematoma in the right  temporal lobe with mild surrounding edema.  The hematoma has a reference measurement of approximately 10 x 8 x 5 mm. This area has a gross reference measurement of 10 x 7 mm on the previous MRI. An underlying mass or vascular malformation are not entirely excluded.  Clinical correlation recommended.  Follow-up MRI of the brain with without contrast in 2-3 months is suggested to assess for an underlying lesion.  Minimal chronic microvascular ischemic changes in the cerebral white matter.  No evidence of acute territorial infarction.  Asymmetric hyperdensity of the right sigmoid sinus, right jugular vein, and right transverse sinus due to known venous sinus thrombosis.  There is no evident fracture.  Moderate right maxillary mucosal thickening.  mastoid air cells are unremarkable.             CONCLUSION:   1. There is a small parenchymal hematoma in the right temporal lobe with mild surrounding edema.  The hematoma has a reference measurement of approximately 10 x 8 x 5 mm.  This area has a gross reference measurement of 10 x 7 mm on the previous MRI.  An underlying mass or vascular malformation are not entirely excluded.  Clinical correlation recommended.  Follow-up MRI of the brain with without contrast in 2-3 months is suggested to assess for an underlying lesion.  2. Minimal chronic microvascular ischemic changes in the cerebral white matter.  No evidence of acute territorial infarction.  3. Asymmetric hyperdensity of the right sigmoid sinus, right jugular vein, and right transverse sinus due to known venous sinus thrombosis.  Critical results were discussed with the patient's care nurse Danish at 1239 hours on 1/17/2024. Critical results were read back.  LOCATION:  [id number]   Dictated by (CST): Mynor Grace MD on 1/17/2024 at 12:37 PM     Finalized by (CST): Mynor Grace MD on 1/17/2024 at 12:41 PM       MRI BRAIN (W+WO) (CPT=70553)    Result Date: 1/16/2024  PROCEDURE:  MRI BRAIN (W+WO) (CPT=70553)   COMPARISON:  None.  INDICATIONS:  R temportal mass  TECHNIQUE:  MRI of the brain was performed with multi-planar T1, T2-weighted images with FLAIR sequences and diffusion weighted images without and with infusion.  PATIENT STATED HISTORY:(As transcribed by Technologist)  Right sided neck,ear, and headache.   CONTRAST USED:  30 mL of Dotarem  FINDINGS:   Region of blooming artifact with mild FLAIR abnormality along with T2 abnormality in the right temporal lobe is noted.  Low signal on T1 is also noted.  Minimal enhancement extending towards this region is noted.  This is suspicious for a cavernoma.  This measures approximately 1.2 x 0.9 cm.  Associated susceptibility is noted.  Direct correlation with prior CT examination be helpful.  There is thrombosis of the right transverse sinus, sigmoid sinus along with the very proximal right internal jugular vein.  The left transverse sinus, sigmoid sinus along with straight sinus and superior sagittal sinus are patent.  Internal cerebral veins are patent.  There is no evidence of an acute infarct.  Mild mucoperiosteal thickening of the paranasal sinuses.  Flow voids the [id number] are patent.  Mastoid air cells are overall unremarkable.               CONCLUSION:   1. There is suggestion of a cavernoma in the right temporal lobe that measures approximately 1.2 x 0.9 cm.  Direct correlation with prior CT examination be helpful.  2. There is thrombosis of the right transverse and sigmoid sinuses.  There is extension of this thrombus into the proximal right internal jugular vein.  3. No evidence of an acute infarct.  This critical result was discussed with Dr. Dorado at 1512 hours on 1/16/2024. Read back was performed.    LOCATION:  [facility]    Dictated by (CST): Kwesi Viveros MD on 1/16/2024 at 3:05 PM     Finalized by (CST): Kwesi Viveros MD on 1/16/2024 at 3:12 PM         Operative reports:      Hospital course:    Patient is a 58 year old female with PMH sig  for htn, hld, dm2, here for headaches, nausea     She was found to have R temporal cavernoma and R transverse sinus thrombosis   She was started on heparin drip, then lovenox. Will dc on Xarelto   Repeat imaging was neg for worsening findings - had a hyperdense area-unclear if from bleeding or from the cavernoma but did not change with AC. Neurosurgery, neuro and heme saw    Also found to have acute sinusitis - started on augmentin       Day of discharge exam:  Vitals:    01/19/24 1200   BP: 136/64   Pulse: 86   Resp: 19   Temp: 98.1 °F (36.7 °C)     Overall feeling better  Was taking norco but was making her too drowsy      No acute distress, alert and oriented   Lungs clear  Heart regular  Abdomen benign    Total time coordinating care 35 min      Patient and/or family had opportunity to ask questions and expressed understanding and agreement with therapeutic plan as outlined         Yimi Dumont Hospitalist  [phone]  Answering Service: [phone]      Electronically signed by Yimi De La Cruz DO on 1/19/2024  1:44 PM         REVIEWER COMMENTS

## 2024-02-09 ENCOUNTER — HOSPITAL ENCOUNTER (OUTPATIENT)
Dept: CT IMAGING | Age: 59
Discharge: HOME | End: 2024-02-09
Attending: NURSE PRACTITIONER
Payer: COMMERCIAL

## 2024-02-09 DIAGNOSIS — Q28.3 CEREBRAL CAVERNOMA: ICD-10-CM

## 2024-02-09 PROCEDURE — 70450 CT HEAD/BRAIN W/O DYE: CPT | Performed by: NURSE PRACTITIONER

## 2024-02-12 ENCOUNTER — OFFICE VISIT (OUTPATIENT)
Dept: SURGERY | Facility: CLINIC | Age: 59
End: 2024-02-12
Payer: COMMERCIAL

## 2024-02-12 VITALS
BODY MASS INDEX: 40.4 KG/M2 | HEIGHT: 63 IN | HEART RATE: 102 BPM | DIASTOLIC BLOOD PRESSURE: 86 MMHG | SYSTOLIC BLOOD PRESSURE: 128 MMHG | WEIGHT: 228 LBS

## 2024-02-12 DIAGNOSIS — Q28.3 CEREBRAL CAVERNOMA: Primary | ICD-10-CM

## 2024-02-12 PROCEDURE — 3074F SYST BP LT 130 MM HG: CPT | Performed by: NURSE PRACTITIONER

## 2024-02-12 PROCEDURE — 3008F BODY MASS INDEX DOCD: CPT | Performed by: NURSE PRACTITIONER

## 2024-02-12 PROCEDURE — 99213 OFFICE O/P EST LOW 20 MIN: CPT | Performed by: NURSE PRACTITIONER

## 2024-02-12 PROCEDURE — 3079F DIAST BP 80-89 MM HG: CPT | Performed by: NURSE PRACTITIONER

## 2024-02-12 RX ORDER — ICOSAPENT ETHYL 1000 MG/1
2 CAPSULE ORAL 2 TIMES DAILY
COMMUNITY
Start: 2024-01-02 | End: 2024-02-16 | Stop reason: ALTCHOICE

## 2024-02-12 RX ORDER — BACLOFEN 20 MG
1 TABLET ORAL DAILY
COMMUNITY
Start: 2023-11-14

## 2024-02-12 RX ORDER — FLUTICASONE PROPIONATE 50 MCG
1 SPRAY, SUSPENSION (ML) NASAL 2 TIMES DAILY
COMMUNITY
Start: 2024-01-29

## 2024-02-12 NOTE — PATIENT INSTRUCTIONS
Schedule a MRI, MRA , MRV of the brain in July, 2024  Then schedule a follow up visit with AYANA Blanton to discuss results.      Refill policies:    Allow 2-3 business days for refills; controlled substances may take longer.  Contact your pharmacy at least 5 days prior to running out of medication and have them send an electronic request or submit request through the “request refill” option in your [employer] account.  Refills are not addressed on weekends; covering physicians do not authorize routine medications on weekends.  No narcotics or controlled substances are refilled after noon on Fridays or by on call physicians.  By law, narcotics must be electronically prescribed.  A 30 day supply with no refills is the maximum allowed.  If your prescription is due for a refill, you may be due for a follow up appointment.  To best provide you care, patients receiving routine medications need to be seen at least once a year.  Patients receiving narcotic/controlled substance medications need to be seen at least once every 3 months.  In the event that your preferred pharmacy does not have the requested medication in stock (e.g. Backordered), it is your responsibility to find another pharmacy that has the requested medication available.  We will gladly send a new prescription to that pharmacy at your request.    Scheduling Tests:    If your physician has ordered radiology tests such as MRI or CT scans, please contact Central Scheduling at [phone] right away to schedule the test.  Once scheduled, the [facility] Centralized Referral Team will work with your insurance carrier to obtain pre-certification or prior authorization.  Depending on your insurance carrier, approval may take 3-10 days.  It is highly recommended patients assure they have received an authorization before having a test performed.  If test is done without insurance authorization, patient may be responsible for the entire amount billed.      Precertification and  Prior Authorizations:  If your physician has recommended that you have a procedure or additional testing performed the [facility] Centralized Referral Team will contact your insurance carrier to obtain pre-certification or prior authorization.    You are strongly encouraged to contact your insurance carrier to verify that your procedure/test has been approved and is a COVERED benefit.  Although the [facility] Centralized Referral Team does its due diligence, the insurance carrier gives the disclaimer that \"Although the procedure is authorized, this does not guarantee payment.\"    Ultimately the patient is responsible for payment.   Thank you for your understanding in this matter.  Paperwork Completion:  If you require FMLA or disability paperwork for your recovery, please make sure to either drop it off or have it faxed to our office at [phone]. Be sure the form has your name and date of birth on it.  The form will be faxed to our Forms Department and they will complete it for you.  There is a 25$ fee for all forms that need to be filled out.  Please be aware there is a 10-14 day turnaround time.  You will need to sign a release of information (KENISHA) form if your paperwork does not come with one.  You may call the Forms Department with any questions at [phone].  Their fax number is [phone].

## 2024-02-16 NOTE — PROGRESS NOTES
[facility]  Neurological Surgery Clinic Note    Karen Mtz  2/6/1965  [id number]  PCP: Jolene Ocampo MD    REASON FOR VISIT:  Hospital Follow up  Right transverse sigmoid thrombosis  Right temporal cavernoma      HISTORY OF PRESENT ILLNESS:  Karen Mtz is a 59 year old female who presented to the ED on 1/12/24 given incidental findings of a right temporal cavernoma seen on a CT sinus. 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 She reports sinus issues since November 2023 at which time she also had right posterior neck tenderness. She reports that she is having her baseline headaches she experiences, often related to caffeine withdrawal. She denies any seizure activity, thunderclap headaches, or right sided pulsatile tinnitus.     During the course of her admission, she was started on anticoagulation for the venous sinus thrombosis. A baseline MRA of the brain was obtained. Follow up CT was stable without increase in hemorrhage with Lovenox. The outpatient CT completed with Julia was reviewed and the hyperdense mass was consistent with cavernoma. She was switched to oral AC. She presents today to review a follow up head CT.     She has followed up with Dr. Soto with Hematology and continues on Xarelto with a plan for 6 months AC.     PAST MEDICAL HISTORY:  Past Medical History:   Diagnosis Date    Anemia     Asthma     DIABETES 2005    Diabetes mellitus type II, uncontrolled     Disorder of liver     FATTY LIVER    Diverticulosis     Esophageal reflux     Extrinsic asthma, unspecified     ALLERGY INDUCED    High blood pressure     High cholesterol     HYPERLIPIDEMIA     HYPERTRIGLYCERIDEMIA     Hypertriglyceridemia     Neuropathy     ALEXY (obstructive sleep apnea) 5/23/2020 HST    AHI 30 Supine AHI 55 non-supine AHI 24 Sao2 Prince 79%    Other and unspecified hyperlipidemia     Sleep apnea      Type II or unspecified type diabetes mellitus without mention of complication, not stated as uncontrolled     Unspecified essential hypertension        PAST SURGICAL HISTORY:  Past Surgical History:   Procedure Laterality Date          COLONOSCOPY      COLONOSCOPY,DIAGNOSTIC N/A 10/10/2015    Procedure: ESOPHAGOGASTRODUODENOSCOPY, COLONOSCOPY, POSSIBLE BIOPSY, POSSIBLE POLYPECTOMY 31699, 05915;  Surgeon: Ishan Fournier MD;  Location: [facility]    LUMPECTOMY RIGHT Right 2023    OTHER SURGICAL HISTORY   [facility]    ENDOSCOPY X'S 3    UPPER GI ENDOSCOPY,DIAGNOSIS N/A 10/10/2015    Procedure: ESOPHAGOGASTRODUODENOSCOPY, COLONOSCOPY, POSSIBLE BIOPSY, POSSIBLE POLYPECTOMY 56765, 49152;  Surgeon: Ishan Fournier MD;  Location: [facility]       FAMILY HISTORY:  family history includes Cancer in her father; High Cholesterol in her mother; Hypertension in her mother; Lung Disorder in her father.    SOCIAL HISTORY:   reports that she has never smoked. She has never used smokeless tobacco. She reports current drug use. Drug: Cannabis. She reports that she does not drink alcohol.    ALLERGIES:  Allergies   Allergen Reactions    Ibuprofen HIVES    Lisinopril ANAPHYLAXIS    Sulfa Antibiotics HIVES    Aspirin OTHER (SEE COMMENTS)     At age 14, had rash, swelling and diarrhea with Excedrin        MEDICATIONS:  Current Outpatient Medications on File Prior to Visit   Medication Sig Dispense Refill    fluticasone propionate 50 MCG/ACT Nasal Suspension 1 spray by Each Nare route 2 (two) times daily.      Magnesium Oxide -Mg Supplement 500 MG Oral Tab Take 1 tablet by mouth daily.      RIVAROXABAN 15 & 20 MG Oral Tablet Therapy Pack Take As Directed based on package instructions: Days 1-21: 15 mg by mouth twice daily Days 22-30: 20 mg by mouth once daily 1 each 0    Insulin Glargine, 2 Unit Dial, (TOUJEO MAX SOLOSTAR) 300 UNIT/ML Subcutaneous Solution Pen-injector Inject  110 Units into the skin daily.      Nystatin Does not apply Powder       fluticasone propionate 50 MCG/ACT Inhalation Aerosol Powder, Breath Activated Inhale 1 puff into the lungs 2 (two) times daily.      cetirizine 10 MG Oral Tab Take 1 tablet (10 mg total) by mouth daily.      metoprolol succinate ER 25 MG Oral Tablet 24 Hr Take 1 tablet (25 mg total) by mouth daily.      Magnesium 500 MG Oral Tab Take by mouth.      fexofenadine 180 MG Oral Tab Take 1 tablet (180 mg total) by mouth daily.      rosuvastatin 10 MG Oral Tab Take 1 tablet (10 mg total) by mouth nightly.      famotidine 20 MG Oral Tab Take 1 tablet (20 mg total) by mouth 2 (two) times daily.      spironolactone 50 MG Oral Tab Take 1 tablet (50 mg total) by mouth daily. 90 tablet 1    furosemide 20 MG Oral Tab Take 1 tablet (20 mg total) by mouth daily. 90 tablet 0    OMEPRAZOLE 40 MG Oral Capsule Delayed Release TAKE ONE CAPSULE BY MOUTH ONCE DAILY 90 capsule 0    albuterol (PROAIR HFA) 108 (90 Base) MCG/ACT Inhalation Aero Soln Inhale 2 puffs into the lungs every 6 (six) hours as needed for Wheezing or Shortness of Breath. 3 each 0    ezetimibe 10 MG Oral Tab Take 1 tablet (10 mg total) by mouth daily. 90 tablet 0    Insulin Aspart Pen (NOVOLOG FLEXPEN) 100 UNIT/ML Subcutaneous Solution Pen-injector Take 10 units with dinner (Patient taking differently: Sliding scale during the day TID  10 units at dinner) 3 each 2    FENOFIBRATE 145 MG Oral Tab TAKE ONE TABLET BY MOUTH ONCE DAILY  (Patient taking differently: 1 tablet (145 mg total) daily.) 90 tablet 0    clotrimazole-betamethasone 1-0.05 % External Cream APPLY TO AFFECTED AREA DAILY 15 g 0    Insulin Pen Needle (NOVOFINE PLUS) 32G X 4 MM Does not apply Misc Use 4 per day 400 each 3     No current facility-administered medications on file prior to visit.       REVIEW OF SYSTEMS:  A 10-point system was reviewed.  Pertinent positives and negatives are noted in HPI.      PHYSICAL EXAMINATION:  VITAL  SIGNS: /86   Pulse 102   Ht 63\"   Wt 228 lb (103.4 kg)   LMP 08/08/2015   BMI 40.39 kg/m²     A&Ox3, no acute distress  PERRL, EOMi, FS, TM  Full strength x 4, no drift  Sensation intact       Imaging Review:  CT BRAIN OR HEAD (48047)    Result Date: 2/10/2024  PROCEDURE:  CT BRAIN OR HEAD (12777)  COMPARISON:  [facility] , CT, CT BRAIN OR HEAD (45481), 1/18/2024, 9:33 AM.  INDICATIONS:  Q28.3 Cerebral cavernoma  TECHNIQUE:  Noncontrast CT scanning is performed through the brain. Dose reduction techniques were used. Dose information is transmitted to the ACR (American College of Radiology) NRDR (National Radiology Data Registry) which includes the Dose Index Registry.  PATIENT STATED HISTORY: (As transcribed by Technologist)  Cerebral cavernoma    FINDINGS: Previously noted right temporal hemorrhage has resolved.  No extra-axial hemorrhage. Lucencies in the deep periventricular white matter are likely sequelae of chronic small vessel ischemic disease. Ventricles and sulci are appropriate for the patient's age. No mass effect. Marked opacification left maxillary sinus.  Visualized portions of the mastoid air cells are unremarkable. Visualized portions of the orbits are unremarkable. IMPRESSION: Right temporal hemorrhage has resolved.    LOCATION:  [id number]   Dictated by (CST): Kwesi Viveros MD on 2/10/2024 at 11:01 AM     Finalized by (CST): Kwesi Viveros MD on 2/10/2024 at 11:02 AM         ASSESSMENT:  60 yo female with right temporal cavernoma and right transverse sinus thrombosis    Plan:  Follow up Head CT shows resolution of blood products.   Continue to follow recommendations from Hematology.  We will follow up with an MRV, MRA, and MRI in July to further follow cavernoma and transverse sinus thrombosis  Follow up in clinic after imaging is completed    The plan and imaging were discussed with Dr. Covarrubias.    PASCALE Bonds, CNP  Neurological Surgery  [facility]  Time: 30 min including face to face time, chart review, imaging interpretation, and coordination of care

## 2024-05-16 DIAGNOSIS — R80.9 MICROALBUMINURIA: Primary | ICD-10-CM

## 2024-05-16 DIAGNOSIS — R60.0 LOWER EXTREMITY EDEMA: ICD-10-CM

## 2024-07-02 ENCOUNTER — HOSPITAL ENCOUNTER (OUTPATIENT)
Dept: MRI IMAGING | Facility: HOSPITAL | Age: 59
Discharge: HOME | End: 2024-07-02
Attending: NURSE PRACTITIONER
Payer: COMMERCIAL

## 2024-07-02 DIAGNOSIS — Q28.3 CEREBRAL CAVERNOMA (HCC): ICD-10-CM

## 2024-07-02 PROCEDURE — A9575 INJ GADOTERATE MEGLUMI 0.1ML: HCPCS | Performed by: NURSE PRACTITIONER

## 2024-07-02 PROCEDURE — 70546 MR ANGIOGRAPH HEAD W/O&W/DYE: CPT | Performed by: NURSE PRACTITIONER

## 2024-07-02 RX ORDER — GADOTERATE MEGLUMINE 376.9 MG/ML
20 INJECTION INTRAVENOUS
Status: COMPLETED | OUTPATIENT
Start: 2024-07-02 | End: 2024-07-02

## 2024-07-02 RX ADMIN — GADOTERATE MEGLUMINE 20 ML: 376.9 INJECTION INTRAVENOUS at 10:43:00

## 2024-07-03 ENCOUNTER — TELEPHONE (OUTPATIENT)
Dept: SURGERY | Facility: CLINIC | Age: 59
End: 2024-07-03

## 2024-07-05 NOTE — TELEPHONE ENCOUNTER
Pt called back to make appt to review MRV and pt asked if should wait til all MRI's completed.  Pt has 3 different MRI's(mri, mra and mrv) pt completed 1, and had 2 scheduled for separate upcoming dates. Psr spoke to MRI lead Shant who stated pt can complete the remaining 2 tests at same time(stated would not take 45 min each). Pt to have done on 7/15/2024 pending ins authorization which states pending. (Scheduled MRI's in 4/2024).Pt has been scheduled for f/u 7/22/2024 to review all.(Pt off work on 22nd so sched with bianca)

## 2024-07-15 ENCOUNTER — HOSPITAL ENCOUNTER (OUTPATIENT)
Dept: MRI IMAGING | Facility: HOSPITAL | Age: 59
Discharge: HOME | End: 2024-07-15
Attending: NURSE PRACTITIONER
Payer: COMMERCIAL

## 2024-07-15 DIAGNOSIS — Q28.3 CEREBRAL CAVERNOMA (HCC): ICD-10-CM

## 2024-07-15 PROCEDURE — 70553 MRI BRAIN STEM W/O & W/DYE: CPT | Performed by: NURSE PRACTITIONER

## 2024-07-15 PROCEDURE — 70546 MR ANGIOGRAPH HEAD W/O&W/DYE: CPT | Performed by: NURSE PRACTITIONER

## 2024-07-15 PROCEDURE — A9575 INJ GADOTERATE MEGLUMI 0.1ML: HCPCS | Performed by: NURSE PRACTITIONER

## 2024-07-15 RX ORDER — GADOTERATE MEGLUMINE 376.9 MG/ML
30 INJECTION INTRAVENOUS
Status: COMPLETED | OUTPATIENT
Start: 2024-07-15 | End: 2024-07-15

## 2024-07-15 RX ADMIN — GADOTERATE MEGLUMINE 30 ML: 376.9 INJECTION INTRAVENOUS at 11:34:00

## 2024-07-16 ENCOUNTER — LAB ENCOUNTER (OUTPATIENT)
Dept: LAB | Age: 59
End: 2024-07-16
Attending: INTERNAL MEDICINE
Payer: COMMERCIAL

## 2024-07-16 DIAGNOSIS — R60.0 LOWER EXTREMITY EDEMA: ICD-10-CM

## 2024-07-16 DIAGNOSIS — R80.9 MICROALBUMINURIA: ICD-10-CM

## 2024-07-16 PROCEDURE — 82043 UR ALBUMIN QUANTITATIVE: CPT | Performed by: INTERNAL MEDICINE

## 2024-07-16 PROCEDURE — 82570 ASSAY OF URINE CREATININE: CPT | Performed by: INTERNAL MEDICINE

## 2024-07-17 ENCOUNTER — MED REC SCAN ONLY (OUTPATIENT)
Dept: NEPHROLOGY | Facility: CLINIC | Age: 59
End: 2024-07-17

## 2024-07-17 LAB
CREAT UR-SCNC: 64.9 MG/DL
MICROALBUMIN UR-MCNC: 98.9 MG/DL
MICROALBUMIN/CREAT 24H UR-RTO: 1523.9 UG/MG (ref ?–30)

## 2024-07-18 ENCOUNTER — OFFICE VISIT (OUTPATIENT)
Dept: NEPHROLOGY | Facility: CLINIC | Age: 59
End: 2024-07-18
Payer: COMMERCIAL

## 2024-07-18 VITALS — BODY MASS INDEX: 42 KG/M2 | SYSTOLIC BLOOD PRESSURE: 132 MMHG | WEIGHT: 238.25 LBS | DIASTOLIC BLOOD PRESSURE: 78 MMHG

## 2024-07-18 DIAGNOSIS — R80.9 MICROALBUMINURIA: Primary | ICD-10-CM

## 2024-07-18 DIAGNOSIS — R60.0 LOWER EXTREMITY EDEMA: ICD-10-CM

## 2024-07-18 PROCEDURE — 3075F SYST BP GE 130 - 139MM HG: CPT | Performed by: INTERNAL MEDICINE

## 2024-07-18 PROCEDURE — 3046F HEMOGLOBIN A1C LEVEL >9.0%: CPT | Performed by: INTERNAL MEDICINE

## 2024-07-18 PROCEDURE — 3078F DIAST BP <80 MM HG: CPT | Performed by: INTERNAL MEDICINE

## 2024-07-18 PROCEDURE — 99214 OFFICE O/P EST MOD 30 MIN: CPT | Performed by: INTERNAL MEDICINE

## 2024-07-18 PROCEDURE — 3060F POS MICROALBUMINURIA REV: CPT | Performed by: INTERNAL MEDICINE

## 2024-07-18 NOTE — PROGRESS NOTES
Nephrology Progress Note      Karen Mtz is a 59 year old female.    HPI:   No chief complaint on file.      Karen Mtz was seen in the nephrology clinic today in follow-up for management of microalbuminuria in the setting of longstanding diabetes and hypertension.  Since her last clinic visit she reports that she was hospitalized with a cerebral sinus venous thrombosis for which she was placed on Xarelto.  She does note that she has been much more actively working to improve her blood glucose control as her A1c had been up to 12%.,  However most recently this has improved to 9%.  She is feeling well and the review of systems otherwise unremarkable.      HISTORY:  Past Medical History:    Anemia    Asthma (HCC)    DIABETES    Diabetes mellitus type II, uncontrolled    Disorder of liver    FATTY LIVER    Diverticulosis    Esophageal reflux    Extrinsic asthma, unspecified    ALLERGY INDUCED    High blood pressure    High cholesterol    HYPERLIPIDEMIA    HYPERTRIGLYCERIDEMIA    Hypertriglyceridemia    Neuropathy    ALEXY (obstructive sleep apnea)    AHI 30 Supine AHI 55 non-supine AHI 24 Sao2 [place] 79%    Other and unspecified hyperlipidemia    Sleep apnea    Type II or unspecified type diabetes mellitus without mention of complication, not stated as uncontrolled    Unspecified essential hypertension      Past Surgical History:   Procedure Laterality Date          Colonoscopy      Colonoscopy,diagnostic N/A 10/10/2015    Procedure: ESOPHAGOGASTRODUODENOSCOPY, COLONOSCOPY, POSSIBLE BIOPSY, POSSIBLE POLYPECTOMY 87670, 77477;  Surgeon: Ishan Fournier MD;  Location: [facility]    Lumpectomy right Right 2023    Other surgical history   [facility]    ENDOSCOPY X'S 3    Upper gi endoscopy,diagnosis N/A 10/10/2015    Procedure: ESOPHAGOGASTRODUODENOSCOPY, COLONOSCOPY, POSSIBLE BIOPSY, POSSIBLE POLYPECTOMY 72198, 03288;  Surgeon: Ishan Fournier MD;  [facility] [facility]      Family History   Problem Relation Age of Onset    Cancer Father         Lung cancer    Lung Disorder Father         COPD    Hypertension Mother     High Cholesterol Mother       Social History:   Social History     Socioeconomic History    Marital status:      Spouse name:     Number of children: 2   Occupational History    Occupation:  worker   Tobacco Use    Smoking status: Never    Smokeless tobacco: Never   Vaping Use    Vaping status: Never Used   Substance and Sexual Activity    Alcohol use: No     Alcohol/week: 0.0 standard drinks of alcohol    Drug use: Yes     Types: Cannabis     Comment: gummies    Sexual activity: Yes     Partners: Male     Social Determinants of Health     Food Insecurity: No Food Insecurity (1/16/2024)    Food Insecurity     Food Insecurity: Never true   Transportation Needs: No Transportation Needs (1/16/2024)    Transportation Needs     Lack of Transportation: No    [facility]    Social Connections   Housing Stability: Low Risk  (1/16/2024)    Housing Stability     Housing Instability: No        Medications (Active prior to today's visit):  Current Outpatient Medications   Medication Sig Dispense Refill    fluticasone propionate 50 MCG/ACT Nasal Suspension 1 spray by Each Nare route 2 (two) times daily.      Magnesium Oxide -Mg Supplement 500 MG Oral Tab Take 1 tablet by mouth daily.      RIVAROXABAN 15 & 20 MG Oral Tablet Therapy Pack Take As Directed based on package instructions: Days 1-21: 15 mg by mouth twice daily Days 22-30: 20 mg by mouth once daily 1 each 0    Insulin Glargine, 2 Unit Dial, (TOUJEO MAX SOLOSTAR) 300 UNIT/ML Subcutaneous Solution Pen-injector Inject 110 Units into the skin daily.      Nystatin Does not apply Powder       fluticasone propionate 50 MCG/ACT Inhalation Aerosol Powder, Breath Activated Inhale 1 puff into the lungs 2 (two) times daily.       cetirizine 10 MG Oral Tab Take 1 tablet (10 mg total) by mouth daily.      metoprolol succinate ER 25 MG Oral Tablet 24 Hr Take 1 tablet (25 mg total) by mouth daily.      Magnesium 500 MG Oral Tab Take by mouth.      fexofenadine 180 MG Oral Tab Take 1 tablet (180 mg total) by mouth daily.      rosuvastatin 10 MG Oral Tab Take 1 tablet (10 mg total) by mouth nightly.      famotidine 20 MG Oral Tab Take 1 tablet (20 mg total) by mouth 2 (two) times daily.      spironolactone 50 MG Oral Tab Take 1 tablet (50 mg total) by mouth daily. 90 tablet 1    furosemide 20 MG Oral Tab Take 1 tablet (20 mg total) by mouth daily. 90 tablet 0    OMEPRAZOLE 40 MG Oral Capsule Delayed Release TAKE ONE CAPSULE BY MOUTH ONCE DAILY 90 capsule 0    albuterol (PROAIR HFA) 108 (90 Base) MCG/ACT Inhalation Aero Soln Inhale 2 puffs into the lungs every 6 (six) hours as needed for Wheezing or Shortness of Breath. 3 each 0    ezetimibe 10 MG Oral Tab Take 1 tablet (10 mg total) by mouth daily. 90 tablet 0    Insulin Aspart Pen (NOVOLOG FLEXPEN) 100 UNIT/ML Subcutaneous Solution Pen-injector Take 10 units with dinner (Patient taking differently: Sliding scale during the day TID  10 units at dinner) 3 each 2    FENOFIBRATE 145 MG Oral Tab TAKE ONE TABLET BY MOUTH ONCE DAILY  (Patient taking differently: 1 tablet (145 mg total) daily.) 90 tablet 0    clotrimazole-betamethasone 1-0.05 % External Cream APPLY TO AFFECTED AREA DAILY 15 g 0    Insulin Pen Needle (NOVOFINE PLUS) 32G X 4 MM Does not apply Misc Use 4 per day 400 each 3       Allergies:  Allergies   Allergen Reactions    Ibuprofen HIVES    Lisinopril ANAPHYLAXIS    Sulfa Antibiotics HIVES    Aspirin OTHER (SEE COMMENTS)     At age 14, had rash, swelling and diarrhea with Excedrin          ROS:     Denies fever/chills  Denies wt loss/gain  Denies HA or visual changes  Denies CP or palpitations  Denies SOB/cough/hemoptysis  Denies abd or flank pain  Denies N/V/D  Denies change in  urinary habits or gross hematuria  + Occasional LE edema  Denies skin rashes/myalgias/arthralgias      PHYSICAL EXAM:   LMP 08/08/2015   Wt Readings from Last 6 Encounters:   02/12/24 228 lb (103.4 kg)   01/18/24 224 lb 13.9 oz (102 kg)   01/17/24 227 lb 1.2 oz (103 kg)   01/12/24 221 lb (100.2 kg)   03/23/23 226 lb (102.5 kg)   03/09/23 226 lb (102.5 kg)     General: Alert and oriented in no apparent distress.  HEENT: No scleral icterus, MMM  Neck: Supple, no KRISSY or thyromegaly  Cardiac: Regular rate and rhythm, S1, S2 normal, no murmur or rub  Lungs: Clear without wheezes, rales, rhonchi.    Extremities: Without clubbing, cyanosis or edema.  Neurologic: Alert and oriented, normal affect, cranial nerves grossly intact, moving all extremities  Skin: Warm and dry, no rashes      LABS:     Lab Results   Component Value Date    BUN 19 01/19/2024    BUNCREA 31.0 (H) 10/02/2021    CREATSERUM 0.94 01/19/2024    ANIONGAP 3 01/19/2024     07/16/2016    GFRNAA 70 11/12/2019    GFRAA 81 11/12/2019    CA 8.9 01/19/2024    OSMOCALC 289 01/19/2024    ALKPHO 79 01/16/2024    AST 15 01/16/2024    ALT 24 01/16/2024    BILT 0.2 01/16/2024    TP 7.4 01/16/2024    ALB 3.3 (L) 01/16/2024    GLOBULIN 4.1 01/16/2024     01/19/2024    K 3.9 01/19/2024     01/19/2024    CO2 26.0 01/19/2024     Lab Results   Component Value Date    RBC 5.02 01/19/2024    HGB 14.2 01/19/2024    HCT 44.0 01/19/2024    .0 01/19/2024    MCV 87.6 01/19/2024    MCH 28.3 01/19/2024    MCHC 32.3 01/19/2024    RDW 13.9 01/19/2024    NEPRELIM 4.69 01/16/2024    NEPERCENT 55.4 01/16/2024    LYPERCENT 36.7 01/16/2024    MOPERCENT 4.6 01/16/2024    EOPERCENT 2.7 01/16/2024    BAPERCENT 0.4 01/16/2024    NE 4.69 01/16/2024    LYMABS 3.11 01/16/2024    MOABSO 0.39 01/16/2024    EOABSO 0.23 01/16/2024    BAABSO 0.03 01/16/2024     Lab Results   Component Value Date    CREUR 64.90 07/16/2024     Lab Results   Component Value Date    CLARITY Clear  05/06/2019    SPECGRAVITY 1.022 05/06/2019    GLUUR Negative 05/06/2019    BILUR Negative 05/06/2019    KETUR Negative 05/06/2019    BLOODURINE Negative 05/06/2019    PHURINE 5.0 05/06/2019    PROUR >=500 (A) 05/06/2019    UROBILINOGEN <2.0 05/06/2019    NITRITE Negative 05/06/2019    LEUUR Trace (A) 05/06/2019    WBCUR 5-10 (A) 05/06/2019    RBCUR 0-2 05/06/2019    EPIUR None Seen 05/06/2019    BACUR None Seen 05/06/2019       Labs from July 16 revealed microalbumin to creatinine ratio of 1523 mcg/mg  Serum creatinine 0.91 mg/dL  Hemoglobin A1c 9%    ASSESSMENT/PLAN:     #1.  Microalbuminuria-this has been longstanding in the setting of diabetes which has been under very poor control.  Having said she is actively working to improve her diabetic control and had a significant improvement in her hemoglobin A1c level.  The microalbumin to creatinine ratio is slightly higher and we did discuss that with improved blood glucose control and hopeful that this will continue to improve.  She is not on an ACE inhibitor or angiotensin receptor blocker due to anaphylaxis with ACE inhibitor's and also intolerance to ARB's.  I have encouraged her to continue to monitor her blood sugars very closely    #2.  Edema-she has improved swelling overall and takes furosemide only on an as-needed basis      Jason Ortega MD  7/18/2024  11:21 AM

## 2024-07-22 ENCOUNTER — OFFICE VISIT (OUTPATIENT)
Dept: SURGERY | Facility: CLINIC | Age: 59
End: 2024-07-22
Payer: COMMERCIAL

## 2024-07-22 VITALS — SYSTOLIC BLOOD PRESSURE: 130 MMHG | DIASTOLIC BLOOD PRESSURE: 82 MMHG

## 2024-07-22 DIAGNOSIS — D18.00 CAVERNOMA: Primary | ICD-10-CM

## 2024-07-22 DIAGNOSIS — I82.90 VENOUS THROMBOSIS: ICD-10-CM

## 2024-07-22 PROCEDURE — 3075F SYST BP GE 130 - 139MM HG: CPT | Performed by: NURSE PRACTITIONER

## 2024-07-22 PROCEDURE — 3079F DIAST BP 80-89 MM HG: CPT | Performed by: NURSE PRACTITIONER

## 2024-07-22 PROCEDURE — 99213 OFFICE O/P EST LOW 20 MIN: CPT | Performed by: NURSE PRACTITIONER

## 2024-07-22 RX ORDER — ICOSAPENT ETHYL 1 G/1
2 CAPSULE ORAL 2 TIMES DAILY
COMMUNITY
Start: 2024-04-18

## 2024-07-22 RX ORDER — GLIPIZIDE 2.5 MG/1
TABLET, EXTENDED RELEASE ORAL
COMMUNITY
Start: 2024-07-02

## 2024-07-22 NOTE — PROGRESS NOTES
[facility]  Neurological Surgery Clinic Note     Karen Mtz  1965  [id number]  PCP: Jolene Ocampo MD     REASON FOR VISIT:  Imaging follow up for right temporal cavernoma and right transverse sigmoid thrombus     HISTORY OF PRESENT ILLNESS:  Karen Mtz is a 59 year old female with past medical history of DMT2, HTN, HLD, ALEXY, and asthma who presented to the ED on 24 given incidental findings of a right temporal cavernoma seen on a CT sinus. 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 Patient also had a right temporal lobe hemorrhage. She reported sinus issues since 2023 at which time she also had right posterior neck tenderness. She denies any seizure activity, thunderclap headaches, or right sided pulsatile tinnitus.      PAST MEDICAL HISTORY:  Past Medical History       Past Medical History:    Anemia    Asthma (HCC)    DIABETES    Diabetes mellitus type II, uncontrolled    Disorder of liver     FATTY LIVER    Diverticulosis    Esophageal reflux    Extrinsic asthma, unspecified     ALLERGY INDUCED    High blood pressure    High cholesterol    HYPERLIPIDEMIA    HYPERTRIGLYCERIDEMIA    Hypertriglyceridemia    Neuropathy    ALEXY (obstructive sleep apnea)     AHI 30 Supine AHI 55 non-supine AHI 24 Sao2 Prince 79%    Other and unspecified hyperlipidemia    Sleep apnea    Type II or unspecified type diabetes mellitus without mention of complication, not stated as uncontrolled    Unspecified essential hypertension            PAST SURGICAL HISTORY:  Past Surgical History         Past Surgical History:   Procedure Laterality Date            Colonoscopy        Colonoscopy,diagnostic N/A 10/10/2015     Procedure: ESOPHAGOGASTRODUODENOSCOPY, COLONOSCOPY, POSSIBLE BIOPSY, POSSIBLE POLYPECTOMY 12453, 05732;  Surgeon: Ishan Fournier MD;  Location: [facility]     Lumpectomy right Right 03/16/2023    Other surgical history   9/08 [facility]     ENDOSCOPY X'S 3    Upper gi endoscopy,diagnosis N/A 10/10/2015     Procedure: ESOPHAGOGASTRODUODENOSCOPY, COLONOSCOPY, POSSIBLE BIOPSY, POSSIBLE POLYPECTOMY 74498, 08983;  Surgeon: Ishan Fournier MD;  Location: [facility], [facility]            FAMILY HISTORY:  family history includes Cancer in her father; High Cholesterol in her mother; Hypertension in her mother; Lung Disorder in her father.     SOCIAL HISTORY:   reports that she has never smoked. She has never used smokeless tobacco. She reports current drug use. Drug: Cannabis. She reports that she does not drink alcohol.     ALLERGIES:  Allergies         Allergies   Allergen Reactions    Ibuprofen HIVES    Lisinopril ANAPHYLAXIS    Sulfa Antibiotics HIVES    Aspirin OTHER (SEE COMMENTS)       At age 14, had rash, swelling and diarrhea with Excedrin             MEDICATIONS:  Medications Ordered Prior to Encounter          Current Outpatient Medications on File Prior to Visit   Medication Sig Dispense Refill    glipiZIDE ER 2.5 MG Oral Tablet 24 Hr          Icosapent Ethyl 1 g Oral Cap Take 2 capsules by mouth 2 (two) times daily.        fluticasone propionate 50 MCG/ACT Nasal Suspension 1 spray by Each Nare route 2 (two) times daily.        RIVAROXABAN 15 & 20 MG Oral Tablet Therapy Pack Take As Directed based on package instructions: Days 1-21: 15 mg by mouth twice daily Days 22-30: 20 mg by mouth once daily 1 each 0    Insulin Glargine, 2 Unit Dial, (TOUJEO MAX SOLOSTAR) 300 UNIT/ML Subcutaneous Solution Pen-injector Inject 110 Units into the skin daily.        Nystatin Does not apply Powder          fluticasone propionate 50 MCG/ACT Inhalation Aerosol Powder, Breath Activated Inhale 1 puff into the lungs 2 (two) times daily.        cetirizine 10 MG Oral Tab Take 1 tablet (10 mg total) by mouth daily.        metoprolol succinate ER 25 MG Oral Tablet 24 Hr Take 1  tablet (25 mg total) by mouth daily.        Magnesium 500 MG Oral Tab Take by mouth.        fexofenadine 180 MG Oral Tab Take 1 tablet (180 mg total) by mouth daily.        rosuvastatin 10 MG Oral Tab Take 1 tablet (10 mg total) by mouth nightly.        famotidine 20 MG Oral Tab Take 1 tablet (20 mg total) by mouth 2 (two) times daily.        spironolactone 50 MG Oral Tab Take 1 tablet (50 mg total) by mouth daily. 90 tablet 1    furosemide 20 MG Oral Tab Take 1 tablet (20 mg total) by mouth daily. 90 tablet 0    OMEPRAZOLE 40 MG Oral Capsule Delayed Release TAKE ONE CAPSULE BY MOUTH ONCE DAILY 90 capsule 0    albuterol (PROAIR HFA) 108 (90 Base) MCG/ACT Inhalation Aero Soln Inhale 2 puffs into the lungs every 6 (six) hours as needed for Wheezing or Shortness of Breath. 3 each 0    ezetimibe 10 MG Oral Tab Take 1 tablet (10 mg total) by mouth daily. 90 tablet 0    Insulin Aspart Pen (NOVOLOG FLEXPEN) 100 UNIT/ML Subcutaneous Solution Pen-injector Take 10 units with dinner (Patient taking differently: Sliding scale during the day TID  10 units at dinner) 3 each 2    FENOFIBRATE 145 MG Oral Tab TAKE ONE TABLET BY MOUTH ONCE DAILY  (Patient taking differently: 1 tablet (145 mg total) daily.) 90 tablet 0    clotrimazole-betamethasone 1-0.05 % External Cream APPLY TO AFFECTED AREA DAILY 15 g 0    Insulin Pen Needle (NOVOFINE PLUS) 32G X 4 MM Does not apply Misc Use 4 per day 400 each 3      No current facility-administered medications on file prior to visit.            REVIEW OF SYSTEMS:  A 10-point system was reviewed.  Pertinent positives and negatives are noted in HPI.       PHYSICAL EXAMINATION:  VITAL SIGNS: /82 (BP Location: Right arm, Patient Position: Sitting, Cuff Size: large)   LMP 08/08/2015      A&Ox3, no acute distress  PERRL, EOMi, FS, TM  Full strength x 4, no drift  Sensation intact         Imaging Review: MRI Brain (W + WO) 7/15/24  FINDINGS:       The ventricles and sulci are within normal  limits.       There is no midline shift or mass effect.       The basal cisterns are patent.       The craniocervical junction is unremarkable.       Midline structures including corpus callosum, optic chiasm and cerebellar tonsils are overall unremarkable.     There is no acute intracranial hemorrhage or extra-axial fluid collection identified.       Minimal FLAIR abnormalities the white matter are stable.       Susceptibility in the right temporal lobe related to prior hemorrhage is decreased when compared to prior examination.  Underlying cavernoma in this region cannot be excluded.       Previously noted large thrombus in the right transverse, sigmoid sinus extending into the right internal jugular vein has markedly decreased in size.  No new enhancing lesions.     There is no restricted diffusion to suggest acute ischemia/infarction.     Fluid in the right mastoid air cells is noted.  Minimal mucoperiosteal thickening of the paranasal sinuses.       The expected major intracranial flow voids are present.         Impression   CONCLUSION:       1. Previously noted right temporal lobe hemorrhage has resolved.  Residual hemosiderin/susceptibility is noted.  Possibility of an underlying venous vascular lesion in this location is still of consideration.     2. Marked dural venous sinus thrombosis of the right transverse sinus, sigmoid sinus and right internal jugular vein has markedly decreased since prior examination.  Residual thrombus in the right sigmoid sinus extending into the proximal right internal  jugular vein is still identified.     3. No new enhancing lesions.          LOCATION:  [facility]        Dictated by (CST): Kwesi iVveros MD on 7/15/2024 at 12:42 PM      Finalized by (CST): Kwesi Viveros MD on 7/15/2024 at 12:49 PM        I have reviewed the imaging personally and discussed with Dr. Covarrubias.      ASSESSMENT:  Karen Mtz is a 59 year old female who presented for MRI brain review for  1 cm right temporal cavernoma and a right transverse sigmoid thrombus without venous infarct. The previous right temporal lobe hemorrhage has resolved. The thrombus has markedly decreased in size but is still present since last MRI/MRA brain on 1/17/24 and patient is currently on Xarelto for antiplatelet therapy.      Plan:  > Repeat MRI/MRA/MRV in 6 months.  > Patient instructed to call clinic if pulsatile tinnitus symptoms occur like whooshing in right ear/hearing her heartbeat.   > We discussed not stopping Xarelto without contacting our office. We reviewed stroke symptoms and when to go to the ED. We discussed going to the ED with symptoms of WHOL. The patient was instructed to continue healthy lifestyle habits to include not smoking, managing blood pressure, healthy eating and regular exercise.     All questions were answered and the patient was instructed to call or message with any additional questions or concerns.         PASCALE Bonds, CNP  Neurological Surgery  [facility]     PASCALE Saucedo Student   [employer]  [facility]      Care Time: 30 min including face to face time, chart review, imaging interpretation, and coordination of care

## 2024-07-22 NOTE — PROGRESS NOTES
[facility]  Neurological Surgery Clinic Note    Karen Mtz  1965  [id number]  PCP: Jolene Ocampo MD    REASON FOR VISIT:  Imaging follow up for right temporal cavernoma and right transverse sigmoid thrombus    HISTORY OF PRESENT ILLNESS:  Karen Mtz is a 59 year old female with past medical history of DMT2, HTN, HLD, ALEXY, and asthma who presented to the ED on 24 given incidental findings of a right temporal cavernoma seen on a CT sinus. 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 Patient also had a right temporal lobe hemorrhage. She reported sinus issues since 2023 at which time she also had right posterior neck tenderness. She denies any seizure activity, thunderclap headaches, or right sided pulsatile tinnitus.     PAST MEDICAL HISTORY:  Past Medical History:    Anemia    Asthma (HCC)    DIABETES    Diabetes mellitus type II, uncontrolled    Disorder of liver    FATTY LIVER    Diverticulosis    Esophageal reflux    Extrinsic asthma, unspecified    ALLERGY INDUCED    High blood pressure    High cholesterol    HYPERLIPIDEMIA    HYPERTRIGLYCERIDEMIA    Hypertriglyceridemia    Neuropathy    ALEXY (obstructive sleep apnea)    AHI 30 Supine AHI 55 non-supine AHI 24 Sao2 [place] 79%    Other and unspecified hyperlipidemia    Sleep apnea    Type II or unspecified type diabetes mellitus without mention of complication, not stated as uncontrolled    Unspecified essential hypertension       PAST SURGICAL HISTORY:  Past Surgical History:   Procedure Laterality Date          Colonoscopy      Colonoscopy,diagnostic N/A 10/10/2015    Procedure: ESOPHAGOGASTRODUODENOSCOPY, COLONOSCOPY, POSSIBLE BIOPSY, POSSIBLE POLYPECTOMY 47929, 04049;  Surgeon: Ishan Fournier MD;  Location: [facility]    Lumpectomy right Right 2023    Other surgical history    [facility]    ENDOSCOPY X'S 3    Upper gi endoscopy,diagnosis N/A 10/10/2015    Procedure: ESOPHAGOGASTRODUODENOSCOPY, COLONOSCOPY, POSSIBLE BIOPSY, POSSIBLE POLYPECTOMY 36320, 48434;  Surgeon: Ishan Fournier MD;  Location: [facility], [facility]       FAMILY HISTORY:  family history includes Cancer in her father; High Cholesterol in her mother; Hypertension in her mother; Lung Disorder in her father.    SOCIAL HISTORY:   reports that she has never smoked. She has never used smokeless tobacco. She reports current drug use. Drug: Cannabis. She reports that she does not drink alcohol.    ALLERGIES:  Allergies   Allergen Reactions    Ibuprofen HIVES    Lisinopril ANAPHYLAXIS    Sulfa Antibiotics HIVES    Aspirin OTHER (SEE COMMENTS)     At age 14, had rash, swelling and diarrhea with Excedrin        MEDICATIONS:  Current Outpatient Medications on File Prior to Visit   Medication Sig Dispense Refill    glipiZIDE ER 2.5 MG Oral Tablet 24 Hr       Icosapent Ethyl 1 g Oral Cap Take 2 capsules by mouth 2 (two) times daily.      fluticasone propionate 50 MCG/ACT Nasal Suspension 1 spray by Each Nare route 2 (two) times daily.      RIVAROXABAN 15 & 20 MG Oral Tablet Therapy Pack Take As Directed based on package instructions: Days 1-21: 15 mg by mouth twice daily Days 22-30: 20 mg by mouth once daily 1 each 0    Insulin Glargine, 2 Unit Dial, (TOUJEO MAX SOLOSTAR) 300 UNIT/ML Subcutaneous Solution Pen-injector Inject 110 Units into the skin daily.      Nystatin Does not apply Powder       fluticasone propionate 50 MCG/ACT Inhalation Aerosol Powder, Breath Activated Inhale 1 puff into the lungs 2 (two) times daily.      cetirizine 10 MG Oral Tab Take 1 tablet (10 mg total) by mouth daily.      metoprolol succinate ER 25 MG Oral Tablet 24 Hr Take 1 tablet (25 mg total) by mouth daily.      Magnesium 500 MG Oral Tab Take by mouth.      fexofenadine 180 MG Oral Tab Take 1 tablet (180 mg total) by mouth daily.       rosuvastatin 10 MG Oral Tab Take 1 tablet (10 mg total) by mouth nightly.      famotidine 20 MG Oral Tab Take 1 tablet (20 mg total) by mouth 2 (two) times daily.      spironolactone 50 MG Oral Tab Take 1 tablet (50 mg total) by mouth daily. 90 tablet 1    furosemide 20 MG Oral Tab Take 1 tablet (20 mg total) by mouth daily. 90 tablet 0    OMEPRAZOLE 40 MG Oral Capsule Delayed Release TAKE ONE CAPSULE BY MOUTH ONCE DAILY 90 capsule 0    albuterol (PROAIR HFA) 108 (90 Base) MCG/ACT Inhalation Aero Soln Inhale 2 puffs into the lungs every 6 (six) hours as needed for Wheezing or Shortness of Breath. 3 each 0    ezetimibe 10 MG Oral Tab Take 1 tablet (10 mg total) by mouth daily. 90 tablet 0    Insulin Aspart Pen (NOVOLOG FLEXPEN) 100 UNIT/ML Subcutaneous Solution Pen-injector Take 10 units with dinner (Patient taking differently: Sliding scale during the day TID  10 units at dinner) 3 each 2    FENOFIBRATE 145 MG Oral Tab TAKE ONE TABLET BY MOUTH ONCE DAILY  (Patient taking differently: 1 tablet (145 mg total) daily.) 90 tablet 0    clotrimazole-betamethasone 1-0.05 % External Cream APPLY TO AFFECTED AREA DAILY 15 g 0    Insulin Pen Needle (NOVOFINE PLUS) 32G X 4 MM Does not apply Misc Use 4 per day 400 each 3     No current facility-administered medications on file prior to visit.       REVIEW OF SYSTEMS:  A 10-point system was reviewed.  Pertinent positives and negatives are noted in HPI.      PHYSICAL EXAMINATION:  VITAL SIGNS: /82 (BP Location: Right arm, Patient Position: Sitting, Cuff Size: large)   LMP 08/08/2015     A&Ox3, no acute distress  PERRL, EOMi, FS, TM  Full strength x 4, no drift  Sensation intact       Imaging Review: MRI Brain (W + WO) 7/15/24  FINDINGS:       The ventricles and sulci are within normal limits.       There is no midline shift or mass effect.       The basal cisterns are patent.       The craniocervical junction is unremarkable.       Midline structures including corpus  callosum, optic chiasm and cerebellar tonsils are overall unremarkable.     There is no acute intracranial hemorrhage or extra-axial fluid collection identified.       Minimal FLAIR abnormalities the white matter are stable.       Susceptibility in the right temporal lobe related to prior hemorrhage is decreased when compared to prior examination.  Underlying cavernoma in this region cannot be excluded.       Previously noted large thrombus in the right transverse, sigmoid sinus extending into the right internal jugular vein has markedly decreased in size.  No new enhancing lesions.     There is no restricted diffusion to suggest acute ischemia/infarction.     Fluid in the right mastoid air cells is noted.  Minimal mucoperiosteal thickening of the paranasal sinuses.       The expected major intracranial flow voids are present.         Impression   CONCLUSION:       1. Previously noted right temporal lobe hemorrhage has resolved.  Residual hemosiderin/susceptibility is noted.  Possibility of an underlying venous vascular lesion in this location is still of consideration.     2. Marked dural venous sinus thrombosis of the right transverse sinus, sigmoid sinus and right internal jugular vein has markedly decreased since prior examination.  Residual thrombus in the right sigmoid sinus extending into the proximal right internal  jugular vein is still identified.     3. No new enhancing lesions.          LOCATION:  [facility]        Dictated by (CST): Kwesi Viveros MD on 7/15/2024 at 12:42 PM      Finalized by (CST): Kwesi Viveros MD on 7/15/2024 at 12:49 PM       I have reviewed the imaging personally and discussed with Dr. Covarrubias.     ASSESSMENT:  Karen Mtz is a 59 year old female who presented for MRI brain review for 1 cm right temporal cavernoma and a right transverse sigmoid thrombus without venous infarct. The previous right temporal lobe hemorrhage has resolved. The thrombus has markedly decreased  in size but is still present since last MRI/MRA brain on 1/17/24 and patient is currently on Xarelto for antiplatelet therapy.     Plan:  > Repeat MRI/MRA/MRV in 6 months.  > Patient instructed to call clinic if pulsatile tinnitus symptoms occur like whooshing in right ear/hearing her heartbeat.   > We discussed not stopping Xarelto without contacting our office. We reviewed stroke symptoms and when to go to the ED. We discussed going to the ED with symptoms of WHOL. The patient was instructed to continue healthy lifestyle habits to include not smoking, managing blood pressure, healthy eating and regular exercise.    All questions were answered and the patient was instructed to call or message with any additional questions or concerns.       PASCALE Bonds, CNP  Neurological Surgery  [facility]    PASCALE Saucedo Student   [employer]  [facility]     Care Time: 30 min including face to face time, chart review, imaging interpretation, and coordination of care

## 2024-07-22 NOTE — PATIENT INSTRUCTIONS
Refill policies:    Allow 2-3 business days for refills; controlled substances may take longer.  Contact your pharmacy at least 5 days prior to running out of medication and have them send an electronic request or submit request through the “request refill” option in your [employer] account.  Refills are not addressed on weekends; covering physicians do not authorize routine medications on weekends.  No narcotics or controlled substances are refilled after noon on Fridays or by on call physicians.  By law, narcotics must be electronically prescribed.  A 30 day supply with no refills is the maximum allowed.  If your prescription is due for a refill, you may be due for a follow up appointment.  To best provide you care, patients receiving routine medications need to be seen at least once a year.  Patients receiving narcotic/controlled substance medications need to be seen at least once every 3 months.  In the event that your preferred pharmacy does not have the requested medication in stock (e.g. Backordered), it is your responsibility to find another pharmacy that has the requested medication available.  We will gladly send a new prescription to that pharmacy at your request.    Scheduling Tests:    If your physician has ordered radiology tests such as MRI or CT scans, please contact Central Scheduling at [phone] right away to schedule the test.  Once scheduled, the [facility] Centralized Referral Team will work with your insurance carrier to obtain pre-certification or prior authorization.  Depending on your insurance carrier, approval may take 3-10 days.  It is highly recommended patients assure they have received an authorization before having a test performed.  If test is done without insurance authorization, patient may be responsible for the entire amount billed.      Precertification and Prior Authorizations:  If your physician has recommended that you have a procedure or additional testing performed the [facility]  Centralized Referral Team will contact your insurance carrier to obtain pre-certification or prior authorization.    You are strongly encouraged to contact your insurance carrier to verify that your procedure/test has been approved and is a COVERED benefit.  Although the [facility] Centralized Referral Team does its due diligence, the insurance carrier gives the disclaimer that \"Although the procedure is authorized, this does not guarantee payment.\"    Ultimately the patient is responsible for payment.   Thank you for your understanding in this matter.  Paperwork Completion:  If you require FMLA or disability paperwork for your recovery, please make sure to either drop it off or have it faxed to our office at [phone]. Be sure the form has your name and date of birth on it.  The form will be faxed to our Forms Department and they will complete it for you.  There is a 25$ fee for all forms that need to be filled out.  Please be aware there is a 10-14 day turnaround time.  You will need to sign a release of information (KENISHA) form if your paperwork does not come with one.  You may call the Forms Department with any questions at [phone].  Their fax number is [phone].

## 2024-09-03 RX ORDER — SPIRONOLACTONE 50 MG/1
50 TABLET, FILM COATED ORAL DAILY
Qty: 90 TABLET | Refills: 1 | Status: SHIPPED | OUTPATIENT
Start: 2024-09-03

## 2024-09-03 RX ORDER — SPIRONOLACTONE 50 MG/1
50 TABLET, FILM COATED ORAL DAILY
Qty: 90 TABLET | Refills: 0 | OUTPATIENT
Start: 2024-09-03

## 2024-09-17 RX ORDER — FUROSEMIDE 20 MG/1
20 TABLET ORAL DAILY
Qty: 90 TABLET | Refills: 1 | Status: SHIPPED | OUTPATIENT
Start: 2024-09-17

## 2024-10-08 ENCOUNTER — OFFICE VISIT (OUTPATIENT)
Dept: FAMILY MEDICINE CLINIC | Facility: CLINIC | Age: 59
End: 2024-10-08
Payer: COMMERCIAL

## 2024-10-08 VITALS
DIASTOLIC BLOOD PRESSURE: 70 MMHG | WEIGHT: 239 LBS | TEMPERATURE: 98 F | SYSTOLIC BLOOD PRESSURE: 108 MMHG | HEART RATE: 85 BPM | OXYGEN SATURATION: 96 % | BODY MASS INDEX: 42.35 KG/M2 | HEIGHT: 63 IN | RESPIRATION RATE: 18 BRPM

## 2024-10-08 DIAGNOSIS — H10.32 ACUTE BACTERIAL CONJUNCTIVITIS OF LEFT EYE: ICD-10-CM

## 2024-10-08 DIAGNOSIS — J06.9 VIRAL UPPER RESPIRATORY ILLNESS: Primary | ICD-10-CM

## 2024-10-08 PROCEDURE — 99213 OFFICE O/P EST LOW 20 MIN: CPT | Performed by: NURSE PRACTITIONER

## 2024-10-08 PROCEDURE — 87635 SARS-COV-2 COVID-19 AMP PRB: CPT | Performed by: NURSE PRACTITIONER

## 2024-10-08 PROCEDURE — 3074F SYST BP LT 130 MM HG: CPT | Performed by: NURSE PRACTITIONER

## 2024-10-08 PROCEDURE — 3078F DIAST BP <80 MM HG: CPT | Performed by: NURSE PRACTITIONER

## 2024-10-08 PROCEDURE — 3008F BODY MASS INDEX DOCD: CPT | Performed by: NURSE PRACTITIONER

## 2024-10-08 RX ORDER — OFLOXACIN 3 MG/ML
1-2 SOLUTION/ DROPS OPHTHALMIC 4 TIMES DAILY
Qty: 5 ML | Refills: 0 | Status: SHIPPED | OUTPATIENT
Start: 2024-10-08 | End: 2024-10-15

## 2024-10-08 NOTE — PROGRESS NOTES
CHIEF COMPLAINT:     Chief Complaint   Patient presents with    Eye Problem     Yesterday, stuffy nose, chills, left eye redness started today, white discharge noticed during the day  OTC none       HPI:   Karen Mtz is a 59 year old female who presents with chief complaint of \"pink eye\". Symptoms began  1  days ago. Symptoms have been worsening since onset.   Patient reports left eye redness, no eyelid crusting, white discharge, + itching, no eyelid swelling, no tearing. Contact lens wearer: no. + cold symptoms.   Denies fever, change in vision, sensitivity to light, pain with eye movement, foreign body sensation, eye injury, or contact with irritant.     Treatments tried: none  Previous eye surgery: no    Current Outpatient Medications   Medication Sig Dispense Refill    ofloxacin 0.3 % Ophthalmic Solution Place 1-2 drops into the left eye 4 (four) times daily for 7 days. In affected eye 5 mL 0    furosemide 20 MG Oral Tab Take 1 tablet (20 mg total) by mouth daily. 90 tablet 1    spironolactone 50 MG Oral Tab Take 1 tablet (50 mg total) by mouth daily. 90 tablet 1    glipiZIDE ER 2.5 MG Oral Tablet 24 Hr       Icosapent Ethyl 1 g Oral Cap Take 2 capsules by mouth 2 (two) times daily.      fluticasone propionate 50 MCG/ACT Nasal Suspension 1 spray by Each Nare route 2 (two) times daily.      RIVAROXABAN 15 & 20 MG Oral Tablet Therapy Pack Take As Directed based on package instructions: Days 1-21: 15 mg by mouth twice daily Days 22-30: 20 mg by mouth once daily 1 each 0    Insulin Glargine, 2 Unit Dial, (TOUJEO DEMETRI SOLOSTAR) 300 UNIT/ML Subcutaneous Solution Pen-injector Inject 110 Units into the skin daily.      Nystatin Does not apply Powder       fluticasone propionate 50 MCG/ACT Inhalation Aerosol Powder, Breath Activated Inhale 1 puff into the lungs 2 (two) times daily.      cetirizine 10 MG Oral Tab Take 1 tablet (10 mg total) by mouth daily.      metoprolol succinate ER 25 MG Oral Tablet 24 Hr Take 1  tablet (25 mg total) by mouth daily.      Magnesium 500 MG Oral Tab Take by mouth.      fexofenadine 180 MG Oral Tab Take 1 tablet (180 mg total) by mouth daily.      rosuvastatin 10 MG Oral Tab Take 1 tablet (10 mg total) by mouth nightly.      famotidine 20 MG Oral Tab Take 1 tablet (20 mg total) by mouth 2 (two) times daily.      OMEPRAZOLE 40 MG Oral Capsule Delayed Release TAKE ONE CAPSULE BY MOUTH ONCE DAILY 90 capsule 0    albuterol (PROAIR HFA) 108 (90 Base) MCG/ACT Inhalation Aero Soln Inhale 2 puffs into the lungs every 6 (six) hours as needed for Wheezing or Shortness of Breath. 3 each 0    ezetimibe 10 MG Oral Tab Take 1 tablet (10 mg total) by mouth daily. 90 tablet 0    Insulin Aspart Pen (NOVOLOG FLEXPEN) 100 UNIT/ML Subcutaneous Solution Pen-injector Take 10 units with dinner (Patient taking differently: Sliding scale during the day TID  10 units at dinner) 3 each 2    FENOFIBRATE 145 MG Oral Tab TAKE ONE TABLET BY MOUTH ONCE DAILY  (Patient taking differently: 1 tablet (145 mg total) daily.) 90 tablet 0    clotrimazole-betamethasone 1-0.05 % External Cream APPLY TO AFFECTED AREA DAILY 15 g 0    Insulin Pen Needle (NOVOFINE PLUS) 32G X 4 MM Does not apply Misc Use 4 per day 400 each 3      Past Medical History:    Anemia    Asthma (HCC)    DIABETES    Diabetes mellitus type II, uncontrolled    Disorder of liver    FATTY LIVER    Diverticulosis    Esophageal reflux    Extrinsic asthma, unspecified    ALLERGY INDUCED    High blood pressure    High cholesterol    HYPERLIPIDEMIA    HYPERTRIGLYCERIDEMIA    Hypertriglyceridemia    Neuropathy    ALEXY (obstructive sleep apnea)    AHI 30 Supine AHI 55 non-supine AHI 24 [place] 79%    Other and unspecified hyperlipidemia    Sleep apnea    Type II or unspecified type diabetes mellitus without mention of complication, not stated as uncontrolled    Unspecified essential hypertension      Past Surgical History:   Procedure Laterality Date           Colonoscopy      Colonoscopy,diagnostic N/A 10/10/2015    Procedure: ESOPHAGOGASTRODUODENOSCOPY, COLONOSCOPY, POSSIBLE BIOPSY, POSSIBLE POLYPECTOMY 26779, 43185;  Surgeon: Ishan Fournier MD;  Location: [facility]    Lumpectomy right Right 03/16/2023    Other surgical history  9/08 [facility]    ENDOSCOPY X'S 3    Upper gi endoscopy,diagnosis N/A 10/10/2015    Procedure: ESOPHAGOGASTRODUODENOSCOPY, COLONOSCOPY, POSSIBLE BIOPSY, POSSIBLE POLYPECTOMY 79157, 39703;  Surgeon: Ishan Fournier MD;  Location: [facility]      Family History   Problem Relation Age of Onset    Cancer Father         Lung cancer    Lung Disorder Father         COPD    Hypertension Mother     High Cholesterol Mother       Social History     Socioeconomic History    Marital status:      Spouse name:     Number of children: 2   Occupational History    Occupation:  worker   Tobacco Use    Smoking status: Never    Smokeless tobacco: Never   Vaping Use    Vaping status: Never Used   Substance and Sexual Activity    Alcohol use: No     Alcohol/week: 0.0 standard drinks of alcohol    Drug use: Yes     Types: Cannabis     Comment: gummies    Sexual activity: Yes     Partners: Male     Social Determinants of Health     Food Insecurity: No Food Insecurity (1/16/2024)    Food Insecurity     Food Insecurity: Never true   Transportation Needs: No Transportation Needs (1/16/2024)    Transportation Needs     Lack of Transportation: No    [facility]    Social Connections   Housing Stability: Low Risk  (1/16/2024)    Housing Stability     Housing Instability: No         REVIEW OF SYSTEMS:   GENERAL: feels well otherwise  SKIN: no rashes  EYES:denies blurred vision or double vision. See HPI  HENT: denies ear pain, congestion, sore throat  LUNGS: denies shortness of breath or cough  CARDIOVASCULAR: denies chest pain or palpitations    GI: denies N/V/C or abdominal pain  NEURO: denies headaches     EXAM:   /70   Pulse 85   Temp 98 °F (36.7 °C)   Resp 18   Ht 5' 3\" (1.6 m)   Wt 239 lb (108.4 kg)   LMP 08/08/2015   SpO2 96%   BMI 42.34 kg/m²   GENERAL: well developed, well nourished,in no apparent distress  SKIN: no rashes,no suspicious lesions  EYES: PERRLA, EOMI, normal optic disc; left conjunctiva erythematous, injected, cream white/green discharge, no tearing,  no lid swelling.  No swelling or redness of periorbital tissue.  Vision WNL see VS  HENT: atraumatic, normocephalic,ears and throat are clear, +congestion  NECK: supple, non tender  LUNGS: clear to auscultation bilaterally.   CARDIO: RRR without murmur  LYMPH: no preauricular lymphadenopathy. Bilateral anterior cervical lymphadenopathy      ASSESSMENT AND PLAN:   Karen Mtz is a 59 year old female who presents with:    ASSESSMENT:   Encounter Diagnoses   Name Primary?    Viral upper respiratory illness Yes    Acute bacterial conjunctivitis of left eye      1. Viral upper respiratory illness  - SARS-CoV-2 by PCR; Future  - SARS-CoV-2 by PCR    2. Acute bacterial conjunctivitis of left eye  - ofloxacin 0.3 % Ophthalmic Solution; Place 1-2 drops into the left eye 4 (four) times daily for 7 days. In affected eye  Dispense: 5 mL; Refill: 0    Discussed viral vs bacterial conjunctivitis. Will treat with antibiotic based on exam.       PLAN: Hygiene and comfort care as listed in patient instructions.    Medication and instructions as listed below  Warm compresses to affected eye prn.  Advised patient to avoid touching eyes; importancestressed  of good handwashing.    Risks, benefits, complications and side effects of meds discussed.  Can return to work/school after on medication for 24 hours.  Follow up with PCP or eye doctor if not improved in 2-3 days    Requested Prescriptions     Signed Prescriptions Disp Refills    ofloxacin 0.3 % Ophthalmic Solution 5 mL 0     Sig:  Place 1-2 drops into the left eye 4 (four) times daily for 7 days. In affected eye         See pt handout    The patient verbalizes understanding and is in agreement with treatment plan.

## 2024-10-09 LAB — SARS-COV-2 RNA RESP QL NAA+PROBE: NOT DETECTED

## 2024-12-02 ENCOUNTER — HOSPITAL ENCOUNTER (OUTPATIENT)
Dept: MRI IMAGING | Facility: HOSPITAL | Age: 59
Discharge: HOME | End: 2024-12-02
Attending: NURSE PRACTITIONER
Payer: COMMERCIAL

## 2024-12-02 DIAGNOSIS — I82.90 VENOUS THROMBOSIS: ICD-10-CM

## 2024-12-02 PROCEDURE — 70546 MR ANGIOGRAPH HEAD W/O&W/DYE: CPT | Performed by: NURSE PRACTITIONER

## 2024-12-02 PROCEDURE — A9575 INJ GADOTERATE MEGLUMI 0.1ML: HCPCS | Performed by: NURSE PRACTITIONER

## 2024-12-02 RX ORDER — GADOTERATE MEGLUMINE 376.9 MG/ML
20 INJECTION INTRAVENOUS
Status: COMPLETED | OUTPATIENT
Start: 2024-12-02 | End: 2024-12-02

## 2024-12-02 RX ADMIN — GADOTERATE MEGLUMINE 20 ML: 376.9 INJECTION INTRAVENOUS at 12:31:00

## 2024-12-03 ENCOUNTER — PATIENT MESSAGE (OUTPATIENT)
Dept: SURGERY | Facility: CLINIC | Age: 59
End: 2024-12-03

## 2024-12-09 ENCOUNTER — HOSPITAL ENCOUNTER (OUTPATIENT)
Dept: MRI IMAGING | Facility: HOSPITAL | Age: 59
Discharge: HOME | End: 2024-12-09
Attending: NURSE PRACTITIONER
Payer: COMMERCIAL

## 2024-12-09 DIAGNOSIS — D18.00 CAVERNOMA: ICD-10-CM

## 2024-12-09 PROCEDURE — 70553 MRI BRAIN STEM W/O & W/DYE: CPT | Performed by: NURSE PRACTITIONER

## 2024-12-09 PROCEDURE — A9575 INJ GADOTERATE MEGLUMI 0.1ML: HCPCS | Performed by: NURSE PRACTITIONER

## 2024-12-09 PROCEDURE — 70544 MR ANGIOGRAPHY HEAD W/O DYE: CPT | Performed by: NURSE PRACTITIONER

## 2024-12-09 RX ORDER — GADOTERATE MEGLUMINE 376.9 MG/ML
20 INJECTION INTRAVENOUS
Status: COMPLETED | OUTPATIENT
Start: 2024-12-09 | End: 2024-12-09

## 2024-12-09 RX ADMIN — GADOTERATE MEGLUMINE 20 ML: 376.9 INJECTION INTRAVENOUS at 11:54:00

## 2024-12-23 ENCOUNTER — OFFICE VISIT (OUTPATIENT)
Dept: SURGERY | Facility: CLINIC | Age: 59
End: 2024-12-23
Payer: COMMERCIAL

## 2024-12-23 VITALS
HEIGHT: 63 IN | DIASTOLIC BLOOD PRESSURE: 76 MMHG | SYSTOLIC BLOOD PRESSURE: 122 MMHG | WEIGHT: 237 LBS | OXYGEN SATURATION: 95 % | BODY MASS INDEX: 41.99 KG/M2 | HEART RATE: 87 BPM

## 2024-12-23 DIAGNOSIS — I82.90 VENOUS THROMBOSIS: Primary | ICD-10-CM

## 2024-12-23 DIAGNOSIS — D18.00 CAVERNOMA: ICD-10-CM

## 2024-12-23 PROCEDURE — 3078F DIAST BP <80 MM HG: CPT | Performed by: NURSE PRACTITIONER

## 2024-12-23 PROCEDURE — 3008F BODY MASS INDEX DOCD: CPT | Performed by: NURSE PRACTITIONER

## 2024-12-23 PROCEDURE — 3074F SYST BP LT 130 MM HG: CPT | Performed by: NURSE PRACTITIONER

## 2024-12-23 PROCEDURE — 99213 OFFICE O/P EST LOW 20 MIN: CPT | Performed by: NURSE PRACTITIONER

## 2024-12-23 RX ORDER — RIVAROXABAN 20 MG/1
20 TABLET, FILM COATED ORAL
COMMUNITY
Start: 2024-12-03

## 2024-12-23 NOTE — PROGRESS NOTES
Established patient:  Reason for follow up:   6 month f/u-review imaging results,cavernoma     Numeric Rating Scale:        Pain at Present: 0/10       New imaging or testing since your last office visit:    MRA brain DOS 12/09/24  MRI brain DOS 12/09/24

## 2024-12-23 NOTE — PROGRESS NOTES
[facility]  Neurological Surgery Clinic Note    Karen Mtz  2/6/1965  [id number]  PCP: Jolene Ocampo MD    REASON FOR VISIT:  Follow up   Image review    HISTORY OF PRESENT ILLNESS:  Karen Mtz is a very pleasant 59 year old female with past medical history of DMT2, HTN, HLD, ALEXY, and asthma who presented to the ED on 1/12/24 given incidental findings of a right temporal cavernoma seen on a CT sinus. 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 Patient also had a right temporal lobe hemorrhage. She reported sinus issues since November 2023 at which time she also had right posterior neck tenderness. She denies any seizure activity, thunderclap headaches, or right sided pulsatile tinnitus. She was instructed to repeat her imaging in 6 months.    Interval Hx 12/23/24:  Continues to have right sided pulsatile tinnitus. Has not been bothersome. Notices the sound sometimes. Sound is \"like a whistle\". It is not pulsating. No other new medical history. Had influenza at the beginning of the month, but now feeling better. Is currently taking Xarelto. Scheduled to see Dr Soto (Hematology) tomorrow.    PAST MEDICAL HISTORY:  Past Medical History:    Anemia    Asthma (HCC)    DIABETES    Diabetes mellitus type II, uncontrolled    Disorder of liver    FATTY LIVER    Diverticulosis    Esophageal reflux    Extrinsic asthma, unspecified    ALLERGY INDUCED    High blood pressure    High cholesterol    HYPERLIPIDEMIA    HYPERTRIGLYCERIDEMIA    Hypertriglyceridemia    Neuropathy    ALEXY (obstructive sleep apnea)    AHI 30 Supine AHI 55 non-supine AHI 24 [place] 79%    Other and unspecified hyperlipidemia    Sleep apnea    Type II or unspecified type diabetes mellitus without mention of complication, not stated as uncontrolled    Unspecified essential hypertension       PAST SURGICAL HISTORY:  Past  Surgical History:   Procedure Laterality Date          Colonoscopy      Colonoscopy,diagnostic N/A 10/10/2015    Procedure: ESOPHAGOGASTRODUODENOSCOPY, COLONOSCOPY, POSSIBLE BIOPSY, POSSIBLE POLYPECTOMY 92868, 34865;  Surgeon: Ishan Fournier MD;  Location: [facility]    Lumpectomy right Right 2023    Other surgical history   [facility]    ENDOSCOPY X'S 3    Upper gi endoscopy,diagnosis N/A 10/10/2015    Procedure: ESOPHAGOGASTRODUODENOSCOPY, COLONOSCOPY, POSSIBLE BIOPSY, POSSIBLE POLYPECTOMY 70677, 21761;  Surgeon: Ishan Fournier MD;  Location: [facility]       FAMILY HISTORY:  family history includes Cancer in her father; High Cholesterol in her mother; Hypertension in her mother; Lung Disorder in her father.    SOCIAL HISTORY:   reports that she has never smoked. She has never used smokeless tobacco. She reports current drug use. Drug: Cannabis. She reports that she does not drink alcohol.    ALLERGIES:  Allergies[1]    MEDICATIONS:  Medications Ordered Prior to Encounter[2]    REVIEW OF SYSTEMS:  A 10-point system was reviewed.  Pertinent positives and negatives are noted in HPI.      PHYSICAL EXAMINATION:  VITAL SIGNS: LMP 2015     A&Ox3, no acute distress  PERRL, EOMi, FS, TM  Full strength x 4, no drift  Sensation intact       Imaging Review:  MRV Brain 24  1. No evidence of significant acute intracranial venous thrombosis.   2. There is significant interval recanalization of the right transverse sinus, right sigmoid sinus, and right jugular vein.  There are small amounts of chronic nonocclusive thrombus noted in the distal right transverse sinus, mid to distal right sigmoid   sinus, and right jugular bulb.     MRA Brain 24  Unremarkable MRA of the bsfedn-up-Hpakiu.     MRI brain w/wo 24  1. Resolving hemosiderin within the right temporal lobe from prior intraparenchymal hemorrhage.  There is no acute hemorrhage or acute  intracranial process.   2. Almost completely resolved filling defect within the right sigmoid sinus consistent with chronic changes from resolving right sinus thrombosis.     ASSESSMENT:  Karen Mtz is a 59 year old female who presented for MRI brain review for 1 cm right temporal cavernoma and a right transverse sigmoid thrombus without venous infarct.     Plan:  No further scans needed  Recommend Vitamin D supplementation for your cavernoma  It is recommended that you check your levels with your PCP and supplement according to the levels  Follow up in 1 year for symptom check       PASCALE Bonds, CNP  Neurological Surgery  [facility]    Care Time: 30 min including face to face time, chart review, imaging interpretation, and coordination of care         [1]   Allergies  Allergen Reactions    Ibuprofen HIVES    Lisinopril ANAPHYLAXIS    Sulfa Antibiotics HIVES    Aspirin OTHER (SEE COMMENTS)     At age 14, had rash, swelling and diarrhea with Excedrin    [2]   Current Outpatient Medications on File Prior to Visit   Medication Sig Dispense Refill    furosemide 20 MG Oral Tab Take 1 tablet (20 mg total) by mouth daily. 90 tablet 1    spironolactone 50 MG Oral Tab Take 1 tablet (50 mg total) by mouth daily. 90 tablet 1    glipiZIDE ER 2.5 MG Oral Tablet 24 Hr       Icosapent Ethyl 1 g Oral Cap Take 2 capsules by mouth 2 (two) times daily.      fluticasone propionate 50 MCG/ACT Nasal Suspension 1 spray by Each Nare route 2 (two) times daily.      RIVAROXABAN 15 & 20 MG Oral Tablet Therapy Pack Take As Directed based on package instructions: Days 1-21: 15 mg by mouth twice daily Days 22-30: 20 mg by mouth once daily 1 each 0    Insulin Glargine, 2 Unit Dial, (TOUJEO MAX SOLOSTAR) 300 UNIT/ML Subcutaneous Solution Pen-injector Inject 110 Units into the skin daily.      Nystatin Does not apply Powder       fluticasone propionate 50 MCG/ACT Inhalation Aerosol Powder, Breath Activated Inhale 1  puff into the lungs 2 (two) times daily.      cetirizine 10 MG Oral Tab Take 1 tablet (10 mg total) by mouth daily.      metoprolol succinate ER 25 MG Oral Tablet 24 Hr Take 1 tablet (25 mg total) by mouth daily.      Magnesium 500 MG Oral Tab Take by mouth.      fexofenadine 180 MG Oral Tab Take 1 tablet (180 mg total) by mouth daily.      rosuvastatin 10 MG Oral Tab Take 1 tablet (10 mg total) by mouth nightly.      famotidine 20 MG Oral Tab Take 1 tablet (20 mg total) by mouth 2 (two) times daily.      OMEPRAZOLE 40 MG Oral Capsule Delayed Release TAKE ONE CAPSULE BY MOUTH ONCE DAILY 90 capsule 0    albuterol (PROAIR HFA) 108 (90 Base) MCG/ACT Inhalation Aero Soln Inhale 2 puffs into the lungs every 6 (six) hours as needed for Wheezing or Shortness of Breath. 3 each 0    ezetimibe 10 MG Oral Tab Take 1 tablet (10 mg total) by mouth daily. 90 tablet 0    Insulin Aspart Pen (NOVOLOG FLEXPEN) 100 UNIT/ML Subcutaneous Solution Pen-injector Take 10 units with dinner (Patient taking differently: Sliding scale during the day TID  10 units at dinner) 3 each 2    FENOFIBRATE 145 MG Oral Tab TAKE ONE TABLET BY MOUTH ONCE DAILY  (Patient taking differently: 1 tablet (145 mg total) daily.) 90 tablet 0    clotrimazole-betamethasone 1-0.05 % External Cream APPLY TO AFFECTED AREA DAILY 15 g 0    Insulin Pen Needle (NOVOFINE PLUS) 32G X 4 MM Does not apply Misc Use 4 per day 400 each 3     No current facility-administered medications on file prior to visit.

## 2024-12-23 NOTE — PATIENT INSTRUCTIONS
No further scans needed  Recommend Vitamin D supplementation for your cavernoma  It is recommended that you check your levels with your PCP and supplement according to the levels  Follow up in 1 year for symptom check              Refill policies:    Allow 2-3 business days for refills; controlled substances may take longer.  Contact your pharmacy at least 5 days prior to running out of medication and have them send an electronic request or submit request through the “request refill” option in your [employer] account.  Refills are not addressed on weekends; covering physicians do not authorize routine medications on weekends.  No narcotics or controlled substances are refilled after noon on Fridays or by on call physicians.  By law, narcotics must be electronically prescribed.  A 30 day supply with no refills is the maximum allowed.  If your prescription is due for a refill, you may be due for a follow up appointment.  To best provide you care, patients receiving routine medications need to be seen at least once a year.  Patients receiving narcotic/controlled substance medications need to be seen at least once every 3 months.  In the event that your preferred pharmacy does not have the requested medication in stock (e.g. Backordered), it is your responsibility to find another pharmacy that has the requested medication available.  We will gladly send a new prescription to that pharmacy at your request.    Scheduling Tests:    If your physician has ordered radiology tests such as MRI or CT scans, please contact Central Scheduling at [phone] right away to schedule the test.  Once scheduled, the [facility] Central[facility] Referral Team will work with your insurance carrier to obtain pre-certification or prior authorization.  Depending on your insurance carrier, approval may take 3-10 days.  It is highly recommended patients assure they have received an authorization before having a test performed.  If test is done without  insurance authorization, patient may be responsible for the entire amount billed.      Precertification and Prior Authorizations:  If your physician has recommended that you have a procedure or additional testing performed the [facility] Centralized Referral Team will contact your insurance carrier to obtain pre-certification or prior authorization.    You are strongly encouraged to contact your insurance carrier to verify that your procedure/test has been approved and is a COVERED benefit.  Although the [facility] Centralized Referral Team does its due diligence, the insurance carrier gives the disclaimer that \"Although the procedure is authorized, this does not guarantee payment.\"    Ultimately the patient is responsible for payment.   Thank you for your understanding in this matter.  Paperwork Completion:  If you require FMLA or disability paperwork for your recovery, please make sure to either drop it off or have it faxed to our office at [phone]. Be sure the form has your name and date of birth on it.  The form will be faxed to our Forms Department and they will complete it for you.  There is a 25$ fee for all forms that need to be filled out.  Please be aware there is a 10-14 day turnaround time.  You will need to sign a release of information (KENISHA) form if your paperwork does not come with one.  You may call the Forms Department with any questions at [phone].  Their fax number is [phone].

## 2025-02-28 ENCOUNTER — MED REC SCAN ONLY (OUTPATIENT)
Dept: NEPHROLOGY | Facility: CLINIC | Age: 60
End: 2025-02-28

## 2025-03-03 RX ORDER — SPIRONOLACTONE 50 MG/1
50 TABLET, FILM COATED ORAL DAILY
Qty: 90 TABLET | Refills: 1 | Status: SHIPPED | OUTPATIENT
Start: 2025-03-03

## 2025-03-17 RX ORDER — FUROSEMIDE 20 MG/1
20 TABLET ORAL DAILY
Qty: 90 TABLET | Refills: 0 | Status: SHIPPED | OUTPATIENT
Start: 2025-03-17

## 2025-04-09 ENCOUNTER — PATIENT MESSAGE (OUTPATIENT)
Dept: SURGERY | Facility: CLINIC | Age: 60
End: 2025-04-09

## 2025-04-10 NOTE — TELEPHONE ENCOUNTER
Noted patient has inquired about Vitamin D supplementation.    Noted on 12/23/24 in office visit note, Franny stated, \"Recommend Vitamin D supplementation for your cavernoma  It is recommended that you check your levels with your PCP and supplement according to the levels\".     Advised the pt as above.

## 2025-05-12 ENCOUNTER — MED REC SCAN ONLY (OUTPATIENT)
Dept: NEPHROLOGY | Facility: CLINIC | Age: 60
End: 2025-05-12

## (undated) DIAGNOSIS — R80.9 MICROALBUMINURIA: Primary | ICD-10-CM

## (undated) NOTE — LETTER
Date: 2/28/2023    Patient Name: Weston Henry          To Whom it may concern: This letter has been written at the patient's request. The above patient was seen at the [facility] for treatment of a medical condition. This patient should be excused from attending work from days missed. The patient may return to work if fever free for 24hrs with improved symptoms  with the following limitations none.         Sincerely,    PASCALE Lafleur

## (undated) NOTE — LETTER
Date: 3/12/2018    Patient Name: Chichi Moreno          To Whom it may concern: This letter has been written at the patient's request. The above patient was seen at the [facility] for treatment of a medical condition.     This patient eliecer

## (undated) NOTE — LETTER
Date & Time: 8/22/2018, 7:10 PM  Patient: Parvin Born  Encounter Provider(s):    BENJY Delvalle       To Whom It May Concern:    Letazay Mejia was seen and treated in our department on 8/22/2018.  She may return to  work on Friday if feeling bet

## (undated) NOTE — LETTER
[address]  [address]  Consent for Procedure/Sedation  Date: ***         Time: ***    {[facility] ivs consent:7226}

## (undated) NOTE — MR AVS SNAPSHOT
[facility]  [address] [place] [address], [address] [phone]               Thank you for choosing us for your health care visit with Luca Diaz MD.  We are glad to serve you and happy to provide you with [facility] Medical Issues Discussed Today     Microalbuminuria    -  Primary      Instructions and Information about Your Health     None      Allergies as of Apr 10, 2017     Lisinopril Anaphylaxis    Aspirin     Kdc:Red Dye+Yellow Dye+Ibuprofen+Sorbitan+Sodium Korey Limes Commonly known as:  CLARITIN-D 12 HOUR           MetFORMIN HCl 1000 MG Tabs   Take 1 tablet (1,000 mg total) by mouth 2 (two) times daily. Commonly known as:  GLUCOPHAGE           Mometasone Furoate 0.1 % Crea   Apply to AA of toe BID x 1-2 weeks.  PRN fl

## (undated) NOTE — LETTER
Date & Time: 9/3/2020, 6:56 PM  Patient: Joby Alba  Encounter Provider(s):    BENJY Gillette       To Whom It May Concern:    Rhiannon Lynne was seen and treated in our department on 9/3/2020. She should not return to work until 9/5/2020.     If yo

## (undated) NOTE — LETTER
Date: 10/8/2024    Patient Name: Karen Mtz          To Whom it may concern:    This letter has been written at the patient's request. The above patient was seen at [facility] for treatment of a medical condition.    This patient should be excused from attending work/school from days missed.     The patient may return to work/school on 10/10 if fever free for 24hrs with the following limitations none.        Sincerely,    Olszewski,Kristen J, APRN

## (undated) NOTE — LETTER
Date: 7/16/2018    Patient Name: Yadira Alcala          To Whom it may concern: This letter has been written at the patient's request. The above patient was seen at the [facility] for treatment of a medical condition.     Please excuse pa

## (undated) NOTE — LETTER
Date: 1/20/2019    Patient Name: Eligio Mason          To Whom it may concern: This letter has been written at the patient's request. The above patient was seen at the [facility] for treatment of a medical condition.     This patient eliecer

## (undated) NOTE — LETTER
10/03/22      Patient Name:  Juana Shelton        To Whom it may concern:    Juana Shelton was evaluated in the office today and should be excused from attending work today, 10/3/22. She may return to work on 10/4/22.       Sincerely,     Rachel Nichols PA-C

## (undated) NOTE — LETTER
Date: 10/30/2017    Patient Name: Maco Fabian          To Whom it may concern: This letter has been written at the patient's request. The above patient was seen at the [facility] for treatment of a medical condition.     This patient sh

## (undated) NOTE — LETTER
Date: 4/10/2019    Patient Name: Shani Salazar          To Whom it may concern: The above patient was seen at the [facility] for treatment of a medical condition.     This patient should be excused from attending work/school from 4/10 thr

## (undated) NOTE — LETTER
Date: 7/31/2017    Patient Name: Zachayr Collier          To Whom it may concern: The above patient was seen at the [facility] for treatment of a medical condition. This patient should be excused from attending work 7/31/17 and 8/1/17.

## (undated) NOTE — LETTER
01/19/24    Karen Mtz      To Whom It May Concern:    This letter has been written at the patient's request. The above patient was seen at [facility] for treatment of a medical condition. Patient is unable to return to work until Monday 01/29/24.        Sincerely,        Yimi De La Cruz, DO  01/19/24, 1:33 PM